# Patient Record
Sex: FEMALE | Race: WHITE | NOT HISPANIC OR LATINO | Employment: FULL TIME | URBAN - METROPOLITAN AREA
[De-identification: names, ages, dates, MRNs, and addresses within clinical notes are randomized per-mention and may not be internally consistent; named-entity substitution may affect disease eponyms.]

---

## 2018-11-06 ENCOUNTER — OFFICE VISIT (OUTPATIENT)
Dept: FAMILY MEDICINE | Facility: CLINIC | Age: 28
End: 2018-11-06
Payer: COMMERCIAL

## 2018-11-06 VITALS
OXYGEN SATURATION: 96 % | HEIGHT: 64 IN | WEIGHT: 167 LBS | HEART RATE: 73 BPM | SYSTOLIC BLOOD PRESSURE: 113 MMHG | DIASTOLIC BLOOD PRESSURE: 73 MMHG | BODY MASS INDEX: 28.51 KG/M2

## 2018-11-06 DIAGNOSIS — R05.9 COUGH: ICD-10-CM

## 2018-11-06 DIAGNOSIS — Z71.6 ENCOUNTER FOR SMOKING CESSATION COUNSELING: Primary | ICD-10-CM

## 2018-11-06 DIAGNOSIS — J20.9 ACUTE BRONCHITIS WITH SYMPTOMS > 10 DAYS: ICD-10-CM

## 2018-11-06 PROCEDURE — 99203 OFFICE O/P NEW LOW 30 MIN: CPT | Performed by: FAMILY MEDICINE

## 2018-11-06 RX ORDER — CODEINE PHOSPHATE AND GUAIFENESIN 10; 100 MG/5ML; MG/5ML
1-2 SOLUTION ORAL
Qty: 120 ML | Refills: 0 | Status: SHIPPED | OUTPATIENT
Start: 2018-11-06 | End: 2018-12-10

## 2018-11-06 RX ORDER — AZITHROMYCIN 250 MG/1
TABLET, FILM COATED ORAL
Qty: 6 TABLET | Refills: 0 | Status: SHIPPED | OUTPATIENT
Start: 2018-11-06 | End: 2018-11-14

## 2018-11-06 RX ORDER — BUPROPION HYDROCHLORIDE 150 MG/1
TABLET, EXTENDED RELEASE ORAL
Qty: 60 TABLET | Refills: 2 | Status: SHIPPED | OUTPATIENT
Start: 2018-11-06 | End: 2019-06-29

## 2018-11-06 NOTE — PROGRESS NOTES
"  SUBJECTIVE:   Rekha Michael is a 28 year old female who presents to clinic today for the following health issues:      Acute Illness   Acute illness concerns: cough  Onset: 3 weeks     Fever: no     Chills/Sweats: no    Headache (location?): no    Sinus Pressure:no    Conjunctivitis:  no    Ear Pain: no    Rhinorrhea: YES    Congestion: YES    Sore Throat: no      Cough: YES-non-productive    Wheeze: no    Decreased Appetite: no    Nausea: no    Vomiting: no    Diarrhea:  no    Dysuria/Freq.: no    Fatigue/Achiness: YES- wakes up from cough at night     Sick/Strep Exposure: no      Therapies Tried and outcome: nyquil, tea, humidifier      Cough is worse at night and would like something besides nyquil.   No heartburn.     PROBLEMS TO ADD ON... Discuss smoking medication to stop. She has tried chantix which made her nauseous. She has used nicotine patches and lozenges. She has tried the quit line plan. She would like to try welbutrin.     Problem list and histories reviewed & adjusted, as indicated.  Additional history: as documented    Labs reviewed in EPIC    Reviewed and updated as needed this visit by clinical staff       Reviewed and updated as needed this visit by Provider         ROS:  Constitutional, HEENT, cardiovascular, pulmonary, gi and gu systems are negative, except as otherwise noted.    OBJECTIVE:     /73  Pulse 73  Ht 5' 4\" (1.626 m)  Wt 167 lb (75.8 kg)  LMP 10/28/2018 (Exact Date)  SpO2 96%  Breastfeeding? No  BMI 28.67 kg/m2  Body mass index is 28.67 kg/(m^2).  GENERAL: healthy, alert and no distress  EYES: Eyes grossly normal to inspection  HENT: ear canals and TM's normal, nose and mouth without ulcers or lesions  NECK: no adenopathy  RESP: lungs clear to auscultation - no rales, rhonchi or wheezes  CV: regular rate and rhythm, normal S1 S2  MS: no gross musculoskeletal defects noted  SKIN: no suspicious lesions or rashes  NEURO: mentation intact and speech normal  PSYCH:  " affect normal/bright  LYMPH: no cervical adenopathy    Diagnostic Test Results:  none     ASSESSMENT/PLAN:       1. Encounter for smoking cessation counseling  - pt interested in Wellbutrin, discussed s/e and starting medication one week before quit date   - buPROPion (WELLBUTRIN SR) 150 MG 12 hr tablet; Take 1 tablet once daily and increase to 1 tablet twice daily after 4 to 7 days  Dispense: 60 tablet; Refill: 2    2. Cough  - azithromycin (ZITHROMAX) 250 MG tablet; Two tablets first day, then one tablet daily for four days.  Dispense: 6 tablet; Refill: 0    3. Acute bronchitis with symptoms > 10 days  - azithromycin (ZITHROMAX) 250 MG tablet; Two tablets first day, then one tablet daily for four days.  Dispense: 6 tablet; Refill: 0  - guaiFENesin-codeine (ROBITUSSIN AC) 100-10 MG/5ML SOLN solution; Take 5-10 mLs by mouth nightly as needed for cough  Dispense: 120 mL; Refill: 0; advised to not drink alcohol or drive with medication   - Follow if symptoms worsen or fail to improve.        Giovanna Arceo MD  [facility]

## 2018-11-06 NOTE — Clinical Note
Please abstract the following data from this visit with this patient into the appropriate field in Epic:  Pap smear done on this date: 12/2016 (approximately), by this group: [facility], results were normal.

## 2018-11-06 NOTE — MR AVS SNAPSHOT
After Visit Summary   11/6/2018    Rekha Michael    MRN: [id number]           Patient Information     Date Of Birth          1990        Visit Information        Provider Department      11/6/2018 7:20 AM Giovanna Arceo MD [facility]        Today's Diagnoses     Encounter for smoking cessation counseling    -  1    Cough        Acute bronchitis with symptoms > 10 days           Follow-ups after your visit        Follow-up notes from your care team     Return in about 7 days (around 11/13/2018) for sympotms are not improving.      Who to contact     If you have questions or need follow up information about today's clinic visit or your schedule please contact [facility] directly at [phone].  Normal or non-critical lab and imaging results will be communicated to you by [employer], letter or phone within 4 business days after the clinic has received the results. If you do not hear from us within 7 days, please contact the clinic through [employer] or phone. If you have a critical or abnormal lab result, we will notify you by phone as soon as possible.  Submit refill requests through [email or web address] or call your pharmacy and they will forward the refill request to us. Please allow 3 business days for your refill to be completed.          Additional Information About Your Visit        MyChart Information     [email or web address] gives you secure access to your electronic health record. If you see a primary care provider, you can also send messages to your care team and make appointments. If you have questions, please call your primary care clinic.  If you do not have a primary care provider, please call [phone] and they will assist you.        Care EveryWhere ID     This is your Care EveryWhere ID. This could be used by other organizations to access your [place] medical records  [id number]        Your Vitals Were     Pulse Height Last Period Pulse Oximetry Breastfeeding? BMI  "(Body Mass Index)    73 5' 4\" (1.626 m) 10/28/2018 (Exact Date) 96% No 28.67 kg/m2       Blood Pressure from Last 3 Encounters:   11/06/18 113/73   09/12/16 103/59    Weight from Last 3 Encounters:   11/06/18 167 lb (75.8 kg)   09/12/16 148 lb 1.6 oz (67.2 kg)              Today, you had the following     No orders found for display         Today's Medication Changes          These changes are accurate as of 11/6/18  2:39 PM.  If you have any questions, ask your nurse or doctor.               Start taking these medicines.        Dose/Directions    azithromycin 250 MG tablet   Commonly known as:  ZITHROMAX   Used for:  Cough, Acute bronchitis with symptoms > 10 days   Started by:  Giovanna Arceo MD        Two tablets first day, then one tablet daily for four days.   Quantity:  6 tablet   Refills:  0       buPROPion 150 MG 12 hr tablet   Commonly known as:  WELLBUTRIN SR   Used for:  Encounter for smoking cessation counseling   Started by:  Giovanna Arceo MD        Take 1 tablet once daily and increase to 1 tablet twice daily after 4 to 7 days   Quantity:  60 tablet   Refills:  2       guaiFENesin-codeine 100-10 MG/5ML Soln solution   Commonly known as:  ROBITUSSIN AC   Used for:  Acute bronchitis with symptoms > 10 days   Started by:  Giovanna Arceo MD        Dose:  1-2 tsp.   Take 5-10 mLs by mouth nightly as needed for cough   Quantity:  120 mL   Refills:  0         Stop taking these medicines if you haven't already. Please contact your care team if you have questions.     REGLAN PO   Stopped by:  Giovanna Arceo MD                Where to get your medicines      These medications were sent to [facility] [address] [place] [facility] [place] [facility] [address] [id number]     Phone:  [phone]     azithromycin 250 MG tablet    buPROPion 150 MG 12 hr tablet         Some of these will need a paper prescription and others can be bought over the counter.  Ask your " nurse if you have questions.     Bring a paper prescription for each of these medications     guaiFENesin-codeine 100-10 MG/5ML Soln solution                Primary Care Provider Office Phone # Fax #    Deqa Jay Arceo [phone] [address]  [facility] [address]        Equal Access to Services     [facility] : Hadii aad ku hadasho Soomaali, waaxda luqadaha, qaybta kaalmada adeegyada, waxay idiin hayaan adeeg khaleshiaprakash lajerald . So [facility] [phone].    ATENCIÓN: Si habla español, tiene a light disposición servicios gratuitos de asistencia lingüística. [facility] [phone].    We comply with applicable federal civil rights laws and [place] laws. We do not discriminate on the basis of race, color, national origin, age, disability, sex, sexual orientation, or gender identity.            Thank you!     Thank you for choosing [facility]  for your care. Our goal is always to provide you with excellent care. Hearing back from our patients is one way we can continue to improve our services. Please take a few minutes to complete the written survey that you may receive in the mail after your visit with us. Thank you!             Your Updated Medication List - Protect others around you: Learn how to safely use, store and throw away your medicines at www.disposemymeds.org.          This list is accurate as of 11/6/18  2:39 PM.  Always use your most recent med list.                   Brand Name Dispense Instructions for use Diagnosis    azithromycin 250 MG tablet    ZITHROMAX    6 tablet    Two tablets first day, then one tablet daily for four days.    Cough, Acute bronchitis with symptoms > 10 days       buPROPion 150 MG 12 hr tablet    WELLBUTRIN SR    60 tablet    Take 1 tablet once daily and increase to 1 tablet twice daily after 4 to 7 days    Encounter for smoking cessation counseling       guaiFENesin-codeine 100-10 MG/5ML Soln solution    ROBITUSSIN AC    120 mL    Take  5-10 mLs by mouth nightly as needed for cough    Acute bronchitis with symptoms > 10 days       IMITREX PO           oxyCODONE-acetaminophen 5-325 MG per tablet    PERCOCET     Take 1 tablet by mouth every 6 hours as needed for moderate to severe pain        ZOFRAN ODT PO      Take 4 mg by mouth every 8 hours as needed for nausea

## 2018-11-14 ENCOUNTER — OFFICE VISIT (OUTPATIENT)
Dept: INTERNAL MEDICINE | Facility: CLINIC | Age: 28
End: 2018-11-14
Payer: COMMERCIAL

## 2018-11-14 VITALS
DIASTOLIC BLOOD PRESSURE: 74 MMHG | TEMPERATURE: 98.7 F | SYSTOLIC BLOOD PRESSURE: 118 MMHG | BODY MASS INDEX: 29.03 KG/M2 | OXYGEN SATURATION: 99 % | WEIGHT: 169.1 LBS | HEART RATE: 77 BPM

## 2018-11-14 DIAGNOSIS — J20.9 ACUTE BRONCHITIS WITH SYMPTOMS > 10 DAYS: Primary | ICD-10-CM

## 2018-11-14 DIAGNOSIS — Z30.09 GENERAL COUNSELING FOR PRESCRIPTION OF ORAL CONTRACEPTIVES: ICD-10-CM

## 2018-11-14 PROCEDURE — 99214 OFFICE O/P EST MOD 30 MIN: CPT | Performed by: PHYSICIAN ASSISTANT

## 2018-11-14 RX ORDER — BENZONATATE 200 MG/1
200 CAPSULE ORAL 3 TIMES DAILY PRN
Qty: 21 CAPSULE | Refills: 0 | Status: SHIPPED | OUTPATIENT
Start: 2018-11-14 | End: 2018-12-10

## 2018-11-14 RX ORDER — PREDNISONE 20 MG/1
20 TABLET ORAL DAILY
Qty: 5 TABLET | Refills: 0 | Status: SHIPPED | OUTPATIENT
Start: 2018-11-14 | End: 2018-11-19

## 2018-11-14 RX ORDER — NORETHINDRONE ACETATE AND ETHINYL ESTRADIOL .02; 1 MG/1; MG/1
1 TABLET ORAL DAILY
Qty: 84 TABLET | Refills: 0 | Status: SHIPPED | OUTPATIENT
Start: 2018-11-14 | End: 2018-12-10

## 2018-11-14 NOTE — MR AVS SNAPSHOT
After Visit Summary   11/14/2018    Rekha Michael    MRN: [id number]           Patient Information     Date Of Birth          1990        Visit Information        Provider Department      11/14/2018 4:00 PM Marivel Hernandez PA-C [facility]        Today's Diagnoses     Acute bronchitis with symptoms > 10 days    -  1    General counseling for prescription of oral contraceptives           Follow-ups after your visit        Who to contact     If you have questions or need follow up information about today's clinic visit or your schedule please contact [facility] directly at [phone].  Normal or non-critical lab and imaging results will be communicated to you by [employer], letter or phone within 4 business days after the clinic has received the results. If you do not hear from us within 7 days, please contact the clinic through [employer] or phone. If you have a critical or abnormal lab result, we will notify you by phone as soon as possible.  Submit refill requests through [employer] or call your pharmacy and they will forward the refill request to us. Please allow 3 business days for your refill to be completed.          Additional Information About Your Visit        MyChart Information     [employer] gives you secure access to your electronic health record. If you see a primary care provider, you can also send messages to your care team and make appointments. If you have questions, please call your primary care clinic.  If you do not have a primary care provider, please call [phone] and they will assist you.        Care EveryWhere ID     This is your Care EveryWhere ID. This could be used by other organizations to access your [place] medical records  [id number]        Your Vitals Were     Pulse Temperature Last Period Pulse Oximetry BMI (Body Mass Index)       77 98.7  F (37.1  C) (Oral) 10/28/2018 (Exact Date) 99% 29.03 kg/m2         Blood Pressure from Last 3 Encounters:   11/14/18 118/74   11/06/18 113/73   09/12/16 103/59    Weight from Last 3 Encounters:   11/14/18 169 lb 1.6 oz (76.7 kg)   11/06/18 167 lb (75.8 kg)   09/12/16 148 lb 1.6 oz (67.2 kg)              Today, you had the following     No orders found for display         Today's Medication Changes          These changes are accurate as of 11/14/18  4:15 PM.  If you have any questions, ask your nurse or doctor.               Start taking these medicines.        Dose/Directions    benzonatate 200 MG capsule   Commonly known as:  TESSALON   Used for:  Acute bronchitis with symptoms > 10 days   Started by:  Marivel Hernandez PA-C        Dose:  200 mg   Take 1 capsule (200 mg) by mouth 3 times daily as needed for cough   Quantity:  21 capsule   Refills:  0       norethindrone-ethinyl estradiol 1-20 MG-MCG per tablet   Commonly known as:  MICROGESTIN 1/20   Used for:  General counseling for prescription of oral contraceptives   Started by:  Marivel Hernandez PA-C        Dose:  1 tablet   Take 1 tablet by mouth daily   Quantity:  84 tablet   Refills:  0       predniSONE 20 MG tablet   Commonly known as:  DELTASONE   Used for:  Acute bronchitis with symptoms > 10 days   Started by:  Marivel Hernandez PA-C        Dose:  20 mg   Take 1 tablet (20 mg) by mouth daily for 5 days   Quantity:  5 tablet   Refills:  0            Where to get your medicines      These medications were sent to [place] [address] [place] [facility] [address] [address]     Phone:  [phone]     benzonatate 200 MG capsule    norethindrone-ethinyl estradiol 1-20 MG-MCG per tablet    predniSONE 20 MG tablet                Primary Care Provider Office Phone # Fax #    Deqa Jay Arceo [phone] [address]  [facility] [address]        Equal Access to Services     SHAZIA VASQUEZ AH: jyothi Yi,  gina willardaajareth ah. So [facility] [phone].    ATENCIÓN: Si seema garcia, tiene a light disposición servicios gratuitos de asistencia lingüística. Kandy al [phone].    We comply with applicable federal civil rights laws and [place] laws. We do not discriminate on the basis of race, color, national origin, age, disability, sex, sexual orientation, or gender identity.            Thank you!     Thank you for choosing [facility]  for your care. Our goal is always to provide you with excellent care. Hearing back from our patients is one way we can continue to improve our services. Please take a few minutes to complete the written survey that you may receive in the mail after your visit with us. Thank you!             Your Updated Medication List - Protect others around you: Learn how to safely use, store and throw away your medicines at www.disposemymeds.org.          This list is accurate as of 11/14/18  4:15 PM.  Always use your most recent med list.                   Brand Name Dispense Instructions for use Diagnosis    benzonatate 200 MG capsule    TESSALON    21 capsule    Take 1 capsule (200 mg) by mouth 3 times daily as needed for cough    Acute bronchitis with symptoms > 10 days       buPROPion 150 MG 12 hr tablet    WELLBUTRIN SR    60 tablet    Take 1 tablet once daily and increase to 1 tablet twice daily after 4 to 7 days    Encounter for smoking cessation counseling       guaiFENesin-codeine 100-10 MG/5ML Soln solution    ROBITUSSIN AC    120 mL    Take 5-10 mLs by mouth nightly as needed for cough    Acute bronchitis with symptoms > 10 days       IMITREX PO           norethindrone-ethinyl estradiol 1-20 MG-MCG per tablet    MICROGESTIN 1/20    84 tablet    Take 1 tablet by mouth daily    General counseling for prescription of oral contraceptives       oxyCODONE-acetaminophen 5-325 MG per tablet    PERCOCET     Take 1 tablet by  mouth every 6 hours as needed for moderate to severe pain        predniSONE 20 MG tablet    DELTASONE    5 tablet    Take 1 tablet (20 mg) by mouth daily for 5 days    Acute bronchitis with symptoms > 10 days       ZOFRAN ODT PO      Take 4 mg by mouth every 8 hours as needed for nausea

## 2018-11-14 NOTE — PROGRESS NOTES
SUBJECTIVE:   Rekha Michael is a 28 year old female who presents to clinic today for the following health issues:      Patient is here because she is still coughing. Coughing has gotten worst.     RESPIRATORY SYMPTOMS      Duration: seen last week for this with PCP office    Treatment with zithromax and codeine cough medication     Description  No runny nose or congestion   No sore throat  Cough  More loose now after abx and guaifenesin cough medications     Severity: moderate    Accompanying signs and symptoms: None    History (predisposing factors):  As above     Smoker, will be quitting tomorrow     And on wellbutrin     Precipitating or alleviating factors: None    Therapies tried and outcome:  As above     Patient also asking to restart birth controll  Recently restarted sexual activity with - had low to no libido for a long time, issues with endometrosis and surgeries in the past  Last with IUD- removed several months ago due to pain  Had been on Depo prior  Oral BCP when she was a teenager.       Problem list and histories reviewed & adjusted, as indicated.  Additional history: as documented    Labs reviewed in EPIC    Reviewed and updated as needed this visit by clinical staff  Allergies  Meds       Reviewed and updated as needed this visit by Provider  Allergies  Meds         ROS:  Constitutional, HEENT, cardiovascular, pulmonary, gi and gu systems are negative, except as otherwise noted.    OBJECTIVE:     /74  Pulse 77  Temp 98.7  F (37.1  C) (Oral)  Wt 169 lb 1.6 oz (76.7 kg)  LMP 10/28/2018 (Exact Date)  SpO2 99%  BMI 29.03 kg/m2  Body mass index is 29.03 kg/(m^2).  GENERAL: healthy, alert and no distress  HENT: ear canals and TM's normal, nose and mouth without ulcers or lesions  NECK: no adenopathy, no asymmetry, masses, or scars and thyroid normal to palpation  RESP: lungs clear to auscultation - no rales, rhonchi or wheezes  CV: regular rates and rhythm and normal S1 S2,  no S3 or S4  SKIN: no suspicious lesions or rashes    Diagnostic Test Results:  none     ASSESSMENT/PLAN:             1. Acute bronchitis with symptoms > 10 days  Reviewed no further abx needed   meds as below to help with coughing   - benzonatate (TESSALON) 200 MG capsule; Take 1 capsule (200 mg) by mouth 3 times daily as needed for cough  Dispense: 21 capsule; Refill: 0  - predniSONE (DELTASONE) 20 MG tablet; Take 1 tablet (20 mg) by mouth daily for 5 days  Dispense: 5 tablet; Refill: 0    2. General counseling for prescription of oral contraceptives  BCP start - patient quitting smoking and is < 35 so can start combination pill.   Follow up with PCP in 2-3 months for recheck on BCP - blood pressure and further refills.   - norethindrone-ethinyl estradiol (MICROGESTIN 1/20) 1-20 MG-MCG per tablet; Take 1 tablet by mouth daily  Dispense: 84 tablet; Refill: 0        Marivel Hernandez PA-C  [facility]

## 2018-12-10 ENCOUNTER — OFFICE VISIT (OUTPATIENT)
Dept: FAMILY MEDICINE | Facility: CLINIC | Age: 28
End: 2018-12-10
Payer: COMMERCIAL

## 2018-12-10 VITALS
TEMPERATURE: 98.3 F | HEART RATE: 71 BPM | DIASTOLIC BLOOD PRESSURE: 83 MMHG | SYSTOLIC BLOOD PRESSURE: 131 MMHG | BODY MASS INDEX: 29.02 KG/M2 | OXYGEN SATURATION: 97 % | HEIGHT: 64 IN | WEIGHT: 170 LBS

## 2018-12-10 DIAGNOSIS — Z00.00 PREVENTATIVE HEALTH CARE: ICD-10-CM

## 2018-12-10 DIAGNOSIS — Z13.220 SCREENING FOR LIPID DISORDERS: ICD-10-CM

## 2018-12-10 DIAGNOSIS — R53.83 OTHER FATIGUE: ICD-10-CM

## 2018-12-10 DIAGNOSIS — Z30.09 GENERAL COUNSELING FOR PRESCRIPTION OF ORAL CONTRACEPTIVES: ICD-10-CM

## 2018-12-10 DIAGNOSIS — Z11.3 SCREEN FOR STD (SEXUALLY TRANSMITTED DISEASE): ICD-10-CM

## 2018-12-10 DIAGNOSIS — R22.1 NECK ENLARGEMENT: ICD-10-CM

## 2018-12-10 DIAGNOSIS — G43.909 MIGRAINE WITHOUT STATUS MIGRAINOSUS, NOT INTRACTABLE, UNSPECIFIED MIGRAINE TYPE: ICD-10-CM

## 2018-12-10 LAB
ERYTHROCYTE [DISTWIDTH] IN BLOOD BY AUTOMATED COUNT: 12.6 % (ref 10–15)
HCT VFR BLD AUTO: 39.3 % (ref 35–47)
HGB BLD-MCNC: 13.1 G/DL (ref 11.7–15.7)
MCH RBC QN AUTO: 30 PG (ref 26.5–33)
MCHC RBC AUTO-ENTMCNC: 33.3 G/DL (ref 31.5–36.5)
MCV RBC AUTO: 90 FL (ref 78–100)
PLATELET # BLD AUTO: 202 10E9/L (ref 150–450)
RBC # BLD AUTO: 4.37 10E12/L (ref 3.8–5.2)
WBC # BLD AUTO: 8.4 10E9/L (ref 4–11)

## 2018-12-10 PROCEDURE — 86780 TREPONEMA PALLIDUM: CPT | Performed by: FAMILY MEDICINE

## 2018-12-10 PROCEDURE — 84443 ASSAY THYROID STIM HORMONE: CPT | Performed by: FAMILY MEDICINE

## 2018-12-10 PROCEDURE — 80061 LIPID PANEL: CPT | Performed by: FAMILY MEDICINE

## 2018-12-10 PROCEDURE — 80048 BASIC METABOLIC PNL TOTAL CA: CPT | Performed by: FAMILY MEDICINE

## 2018-12-10 PROCEDURE — 87591 N.GONORRHOEAE DNA AMP PROB: CPT | Performed by: FAMILY MEDICINE

## 2018-12-10 PROCEDURE — 85027 COMPLETE CBC AUTOMATED: CPT | Performed by: FAMILY MEDICINE

## 2018-12-10 PROCEDURE — 99213 OFFICE O/P EST LOW 20 MIN: CPT | Mod: 25 | Performed by: FAMILY MEDICINE

## 2018-12-10 PROCEDURE — 87389 HIV-1 AG W/HIV-1&-2 AB AG IA: CPT | Performed by: FAMILY MEDICINE

## 2018-12-10 PROCEDURE — 99395 PREV VISIT EST AGE 18-39: CPT | Performed by: FAMILY MEDICINE

## 2018-12-10 PROCEDURE — 87491 CHLMYD TRACH DNA AMP PROBE: CPT | Performed by: FAMILY MEDICINE

## 2018-12-10 PROCEDURE — 36415 COLL VENOUS BLD VENIPUNCTURE: CPT | Performed by: FAMILY MEDICINE

## 2018-12-10 RX ORDER — NORETHINDRONE ACETATE AND ETHINYL ESTRADIOL .02; 1 MG/1; MG/1
1 TABLET ORAL DAILY
Qty: 84 TABLET | Refills: 0 | Status: SHIPPED | OUTPATIENT
Start: 2018-12-10 | End: 2018-12-10

## 2018-12-10 RX ORDER — NORETHINDRONE ACETATE AND ETHINYL ESTRADIOL .02; 1 MG/1; MG/1
1 TABLET ORAL DAILY
Qty: 84 TABLET | Refills: 3 | Status: SHIPPED | OUTPATIENT
Start: 2018-12-10

## 2018-12-10 RX ORDER — SUMATRIPTAN 50 MG/1
TABLET, FILM COATED ORAL
Qty: 7 TABLET | Refills: 5 | Status: SHIPPED | OUTPATIENT
Start: 2018-12-10

## 2018-12-10 ASSESSMENT — MIFFLIN-ST. JEOR: SCORE: 1468.17

## 2018-12-10 ASSESSMENT — ENCOUNTER SYMPTOMS
FREQUENCY: 0
WEAKNESS: 0
SORE THROAT: 0
PARESTHESIAS: 0
HEARTBURN: 0
ARTHRALGIAS: 0
DIZZINESS: 0
DYSURIA: 0
ABDOMINAL PAIN: 1
FEVER: 0
DIARRHEA: 0
JOINT SWELLING: 0
HEADACHES: 1
BREAST MASS: 0
COUGH: 1
PALPITATIONS: 0
SHORTNESS OF BREATH: 0
CHILLS: 0
NERVOUS/ANXIOUS: 0
HEMATOCHEZIA: 0
NAUSEA: 0
EYE PAIN: 0
HEMATURIA: 0
CONSTIPATION: 0
MYALGIAS: 0

## 2018-12-10 NOTE — PROGRESS NOTES
Answers for HPI/ROS submitted by the patient on 12/10/2018   Annual Exam:  Frequency of exercise:: 2-3 days/week  Getting at least 3 servings of Calcium per day:: Yes  Diet:: Regular (no restrictions)  Taking medications regularly:: Yes  Medication side effects:: Not applicable  Bi-annual eye exam:: Yes  Dental care twice a year:: Yes  Sleep apnea or symptoms of sleep apnea:: None  Positive for the following: abdominal pain, cough, headaches  Negative for the following: Blood in stool, Blood in urine, chest pain, chills, congestion, constipation, diarrhea, dizziness, ear pain, eye pain, nervous/anxious, fever, frequency, genital sores, hearing loss, heartburn, arthralgias, joint swelling, peripheral edema, mood changes, myalgias, nausea, dysuria, palpitations, Skin sensation changes, sore throat, urgency, rash, shortness of breath, visual disturbance, weakness  pelvic pain: Yes  vaginal bleeding: No  vaginal discharge: No  tenderness: No  breast mass: No  breast discharge: No  Additional concerns today:: Yes  Duration of exercise:: 30-45 minutes

## 2018-12-10 NOTE — PROGRESS NOTES
b   SUBJECTIVE:   CC: Rekha Michael is an 28 year old woman who presents for preventive health visit.     Physical   Annual:     Getting at least 3 servings of Calcium per day:  Yes    Bi-annual eye exam:  Yes    Dental care twice a year:  Yes    Sleep apnea or symptoms of sleep apnea:  None    Diet:  Regular (no restrictions)    Frequency of exercise:  2-3 days/week    Duration of exercise:  30-45 minutes    Taking medications regularly:  Yes    Medication side effects:  Not applicable    Additional concerns today:  Yes    PHQ-2 Total Score: 0    Pt has endometriosis and having a lot of fatigue. She has a family history of thyroid problems.  She started experiencing fatigue for the last few months. Unsure if it is related to endometriosis pain.   She restarted birth control around three weeks ago. Since starting medication, she has mild spotting.   No fever, chills, cough, shortness of breath, night sweats or appetite changes.  Normal sleep.   She noticed neck enlargement over the last few months.     Today's PHQ-2 Score:   PHQ-2 ( 1999 [employer]) 12/10/2018   Q1: Little interest or pleasure in doing things 0   Q2: Feeling down, depressed or hopeless 0   PHQ-2 Score 0   Q1: Little interest or pleasure in doing things Not at all   Q2: Feeling down, depressed or hopeless Not at all   PHQ-2 Score 0       Abuse: Current or Past(Physical, Sexual or Emotional)- No  Do you feel safe in your environment? Yes    Social History     Tobacco Use     Smoking status: Current Every Day Smoker     Packs/day: 0.50     Types: Cigarettes     Smokeless tobacco: Never Used   Substance Use Topics     Alcohol use: Yes     Alcohol Use 12/10/2018   If you drink alcohol do you typically have greater than 3 drinks per day OR greater than 7 drinks per week? No       Reviewed orders with patient.  Reviewed health maintenance and updated orders accordingly - Yes  Labs reviewed in EPIC    Mammogram not appropriate for this patient based on  "age.    Pertinent mammograms are reviewed under the imaging tab.  History of abnormal Pap smear: NO - age 30- 65 PAP every 3 years recommended     Reviewed and updated as needed this visit by clinical staff         Reviewed and updated as needed this visit by Provider            Review of Systems  CONSTITUTIONAL: see above   INTEGUMENTARU/SKIN: NEGATIVE for worrisome rashes, moles or lesions  EYES: NEGATIVE for vision changes or irritation  ENT: see above   RESP: NEGATIVE for significant cough or SOB  BREAST: NEGATIVE for masses, tenderness or discharge  CV: NEGATIVE for chest pain, palpitations or peripheral edema  GI: NEGATIVE for nausea, abdominal pain, heartburn, or change in bowel habits  : NEGATIVE for unusual urinary or vaginal symptoms. Periods are regular.  MUSCULOSKELETAL: NEGATIVE for significant arthralgias or myalgia  NEURO: NEGATIVE for weakness, dizziness or paresthesias  PSYCHIATRIC: NEGATIVE for changes in mood or affect     OBJECTIVE:   Physical Exam   /83   Pulse 71   Temp 98.3  F (36.8  C) (Oral)   Ht 1.613 m (5' 3.5\")   Wt 77.1 kg (170 lb)   LMP 11/22/2018 (Approximate)   SpO2 97%   Breastfeeding? No   BMI 29.64 kg/m    GENERAL: healthy, alert and no distress  EYES: Eyes grossly normal to inspection, PERRL and conjunctivae and sclerae normal  HENT: ear canals and TM's normal, nose and mouth without ulcers or lesions  NECK: no adenopathy, no asymmetry, masses, or scars and thyroid normal to palpation  RESP: lungs clear to auscultation - no rales, rhonchi or wheezes  CV: regular rate and rhythm, normal S1 S2  ABDOMEN: soft, nontender, no hepatosplenomegaly, no masses and bowel sounds normal  MS: no gross musculoskeletal defects noted, no edema  SKIN: no suspicious lesions or rashes  NEURO: Normal strength and tone, mentation intact and speech normal  PSYCH: mentation appears normal, affect normal/bright    Diagnostic Test Results:  none     ASSESSMENT/PLAN:     1. Preventative " "health care  - see below     2. Screen for STD (sexually transmitted disease)  - Treponema Abs w Reflex to RPR and Titer  - NEISSERIA GONORRHOEA PCR  - CHLAMYDIA TRACHOMATIS PCR  - HIV Antigen Antibody Combo      3. Other fatigue  - D/d include postinfectious as pt had recent illness vs thyroid etiology vs electrolyte imbalance; unlikely inflammatory arthritis vs tick borne illness (pt denies exposure) vs BATSHEVA (normal sleep) vs depression   - ordered below for further evaluation and tx as indicated  - if below is negative then advised to monitor for another 2-3 weeks, call or my chart if symptoms continue to persist  - TSH with free T4 reflex  - CBC with platelets  - Basic metabolic panel  - OFFICE/OUTPT VISIT,EST,LEVL III    4. General counseling for prescription of oral contraceptives  - norethindrone-ethinyl estradiol (MICROGESTIN 1/20) 1-20 MG-MCG tablet; Take 1 tablet by mouth daily  Dispense: 84 tablet; Refill: 3    5. Migraine without status migrainosus, not intractable, unspecified migraine type  - SUMAtriptan (IMITREX) 50 MG tablet; 50 mg at the onset of the headache, can repeat every 2 hours for a maximum daily dose of 200 mg  Dispense: 7 tablet; Refill: 5    6. Screening for lipid disorders  - Lipid Profile (Chol, Trig, HDL, LDL calc)    7. Neck enlargement  - ordered US for further evaluation   - US Thyroid; Future        COUNSELING:  Reviewed preventive health counseling, as reflected in patient instructions    BP Readings from Last 1 Encounters:   11/14/18 118/74     Estimated body mass index is 29.03 kg/m  as calculated from the following:    Height as of 11/6/18: 1.626 m (5' 4\").    Weight as of 11/14/18: 76.7 kg (169 lb 1.6 oz).           reports that she has been smoking cigarettes.  She has been smoking about 0.50 packs per day. she has never used smokeless tobacco.      Counseling Resources:  ATP IV Guidelines  Pooled Cohorts Equation Calculator  Breast Cancer Risk Calculator  FRAX Risk " Assessment  ICSI Preventive Guidelines  Dietary Guidelines for Americans, 2010  USDA's MyPlate  ASA Prophylaxis  Lung CA Screening    Giovanna Arceo MD  [facility] [place]

## 2018-12-10 NOTE — PATIENT INSTRUCTIONS
Please call [phone] to schedule ultrasound.       Preventive Health Recommendations  Female Ages 26 - 39  Yearly exam:   See your health care provider every year in order to    Review health changes.     Discuss preventive care.      Review your medicines if you your doctor has prescribed any.    Until age 30: Get a Pap test every three years (more often if you have had an abnormal result).    After age 30: Talk to your doctor about whether you should have a Pap test every 3 years or have a Pap test with HPV screening every 5 years.   You do not need a Pap test if your uterus was removed (hysterectomy) and you have not had cancer.  You should be tested each year for STDs (sexually transmitted diseases), if you're at risk.   Talk to your provider about how often to have your cholesterol checked.  If you are at risk for diabetes, you should have a diabetes test (fasting glucose).  Shots: Get a flu shot each year. Get a tetanus shot every 10 years.   Nutrition:     Eat at least 5 servings of fruits and vegetables each day.    Eat whole-grain bread, whole-wheat pasta and brown rice instead of white grains and rice.    Get adequate Calcium and Vitamin D.     Lifestyle    Exercise at least 150 minutes a week (30 minutes a day, 5 days of the week). This will help you control your weight and prevent disease.    Limit alcohol to one drink per day.    No smoking.     Wear sunscreen to prevent skin cancer.    See your dentist every six months for an exam and cleaning.

## 2018-12-11 LAB
ANION GAP SERPL CALCULATED.3IONS-SCNC: 6 MMOL/L (ref 3–14)
BUN SERPL-MCNC: 13 MG/DL (ref 7–30)
C TRACH DNA SPEC QL NAA+PROBE: NEGATIVE
CALCIUM SERPL-MCNC: 9.2 MG/DL (ref 8.5–10.1)
CHLORIDE SERPL-SCNC: 105 MMOL/L (ref 94–109)
CHOLEST SERPL-MCNC: 167 MG/DL
CO2 SERPL-SCNC: 25 MMOL/L (ref 20–32)
CREAT SERPL-MCNC: 0.84 MG/DL (ref 0.52–1.04)
GFR SERPL CREATININE-BSD FRML MDRD: 79 ML/MIN/1.7M2
GLUCOSE SERPL-MCNC: 77 MG/DL (ref 70–99)
HDLC SERPL-MCNC: 63 MG/DL
HIV 1+2 AB+HIV1 P24 AG SERPL QL IA: NONREACTIVE
LDLC SERPL CALC-MCNC: 90 MG/DL
N GONORRHOEA DNA SPEC QL NAA+PROBE: NEGATIVE
NONHDLC SERPL-MCNC: 104 MG/DL
POTASSIUM SERPL-SCNC: 3.7 MMOL/L (ref 3.4–5.3)
SODIUM SERPL-SCNC: 136 MMOL/L (ref 133–144)
SPECIMEN SOURCE: NORMAL
SPECIMEN SOURCE: NORMAL
T PALLIDUM AB SER QL: NONREACTIVE
TRIGL SERPL-MCNC: 72 MG/DL
TSH SERPL DL<=0.005 MIU/L-ACNC: 1.24 MU/L (ref 0.4–4)

## 2019-01-03 ENCOUNTER — OFFICE VISIT (OUTPATIENT)
Dept: FAMILY MEDICINE | Facility: CLINIC | Age: 29
End: 2019-01-03

## 2019-01-03 VITALS
HEART RATE: 63 BPM | OXYGEN SATURATION: 100 % | TEMPERATURE: 98.3 F | BODY MASS INDEX: 31.04 KG/M2 | WEIGHT: 178 LBS | SYSTOLIC BLOOD PRESSURE: 128 MMHG | DIASTOLIC BLOOD PRESSURE: 85 MMHG

## 2019-01-03 DIAGNOSIS — R42 VERTIGO: Primary | ICD-10-CM

## 2019-01-03 PROCEDURE — 99214 OFFICE O/P EST MOD 30 MIN: CPT | Performed by: INTERNAL MEDICINE

## 2019-01-03 RX ORDER — FLUTICASONE PROPIONATE 50 MCG
1-2 SPRAY, SUSPENSION (ML) NASAL 2 TIMES DAILY
Qty: 1 BOTTLE | Refills: 0 | Status: SHIPPED | OUTPATIENT
Start: 2019-01-03 | End: 2019-01-13

## 2019-01-03 RX ORDER — MECLIZINE HYDROCHLORIDE 25 MG/1
25 TABLET ORAL 3 TIMES DAILY PRN
Qty: 30 TABLET | Refills: 0 | Status: SHIPPED | OUTPATIENT
Start: 2019-01-03 | End: 2019-01-13

## 2019-01-03 ASSESSMENT — ENCOUNTER SYMPTOMS
VOMITING: 0
NAUSEA: 1
PALPITATIONS: 0
NUMBNESS: 0
ADENOPATHY: 0
FEVER: 0
ARTHRALGIAS: 1
FACIAL ASYMMETRY: 0
MYALGIAS: 1
ABDOMINAL PAIN: 0
COUGH: 0
FATIGUE: 1
SPEECH DIFFICULTY: 0
WEAKNESS: 0
SHORTNESS OF BREATH: 0

## 2019-01-03 NOTE — PROGRESS NOTES
SUBJECTIVE:   Rekha Michael is a 28 year old female presenting with a chief complaint of   Chief Complaint   Patient presents with     Dizziness     Pt in clinic c/o 7 days of dizziness and nausea.     Nausea       She is an established patient of [facility].    Onset of symptoms was 1week(s).  spinn on Friday night after drinking  Worsening after time  Now symptoms worse with turning head right   Dizzy - described as spins, nausea  Needs to brace self  Course of illness is worsening    On menses and has migraines during her period  Having headaches but baseline headaches  Predisposing factors: recent Bronchitis  Treatment and measures tried: fluids  Trying to not turn head      Review of Systems   Constitutional: Positive for fatigue. Negative for fever.   HENT: Positive for tinnitus.         Sick in November with bronchitis 6 weeks  Cold symptoms resolved     On-going intermittently ear ringing   Eyes: Positive for visual disturbance.        Feels like eyeballs twitch with spinning symptoms    Respiratory: Negative for cough and shortness of breath.    Cardiovascular: Negative for chest pain and palpitations.   Gastrointestinal: Positive for nausea. Negative for abdominal pain and vomiting.   Endocrine:        Feels thyroid enlarged  Recent TSH normal    Genitourinary:        On menses   Musculoskeletal: Positive for arthralgias and myalgias.   Skin: Negative for rash.   Allergic/Immunologic: Negative for environmental allergies and food allergies.   Neurological: Negative for facial asymmetry, speech difficulty, weakness and numbness.   Hematological: Negative for adenopathy.     Component      Latest Ref Rng & Units 12/10/2018   Sodium      133 - 144 mmol/L 136   Potassium      3.4 - 5.3 mmol/L 3.7   Chloride      94 - 109 mmol/L 105   Carbon Dioxide      20 - 32 mmol/L 25   Anion Gap      3 - 14 mmol/L 6   Glucose      70 - 99 mg/dL 77   Urea Nitrogen      7 - 30 mg/dL 13   Creatinine      0.52 - 1.04 mg/dL  0.84   GFR Estimate      >60 mL/min/1.7m2 79   GFR Estimate If Black      >60 mL/min/1.7m2 >90   Calcium      8.5 - 10.1 mg/dL 9.2   WBC      4.0 - 11.0 10e9/L 8.4   RBC Count      3.8 - 5.2 10e12/L 4.37   Hemoglobin      11.7 - 15.7 g/dL 13.1   Hematocrit      35.0 - 47.0 % 39.3   MCV      78 - 100 fl 90   MCH      26.5 - 33.0 pg 30.0   MCHC      31.5 - 36.5 g/dL 33.3   RDW      10.0 - 15.0 % 12.6   Platelet Count      150 - 450 10e9/L 202   Cholesterol      <200 mg/dL 167   Triglycerides      <150 mg/dL 72   HDL Cholesterol      >49 mg/dL 63   LDL Cholesterol Calculated      <100 mg/dL 90   Non HDL Cholesterol      <130 mg/dL 104   TSH      0.40 - 4.00 mU/L 1.24       Past Medical History:   Diagnosis Date     Endometriosis      Migraine      History reviewed. No pertinent family history.  Current Outpatient Medications   Medication Sig Dispense Refill     buPROPion (WELLBUTRIN SR) 150 MG 12 hr tablet Take 1 tablet once daily and increase to 1 tablet twice daily after 4 to 7 days 60 tablet 2     fluticasone (FLONASE) 50 MCG/ACT nasal spray Spray 1-2 sprays into both nostrils 2 times daily for 10 days 1 Bottle 0     meclizine (ANTIVERT) 25 MG tablet Take 1 tablet (25 mg) by mouth 3 times daily as needed for dizziness 30 tablet 0     norethindrone-ethinyl estradiol (MICROGESTIN 1/20) 1-20 MG-MCG tablet Take 1 tablet by mouth daily 84 tablet 3     oxyCODONE-acetaminophen (PERCOCET) 5-325 MG per tablet Take 1 tablet by mouth every 6 hours as needed for moderate to severe pain       SUMAtriptan (IMITREX) 50 MG tablet 50 mg at the onset of the headache, can repeat every 2 hours for a maximum daily dose of 200 mg 7 tablet 5     Ondansetron (ZOFRAN ODT PO) Take 4 mg by mouth every 8 hours as needed for nausea       Social History     Tobacco Use     Smoking status: Current Some Day Smoker     Packs/day: 0.50     Types: Cigarettes     Smokeless tobacco: Never Used   Substance Use Topics     Alcohol use: Yes        OBJECTIVE  /85   Pulse 63   Temp 98.3  F (36.8  C) (Tympanic)   Wt 80.7 kg (178 lb)   SpO2 100%   BMI 31.04 kg/m      Physical Exam   Constitutional: She appears well-developed and well-nourished.   HENT:   Mouth/Throat: Oropharynx is clear and moist. No oropharyngeal exudate.   Tm nl b   Eyes: EOM are normal. Pupils are equal, round, and reactive to light.   Neck: No thyromegaly present.   Cardiovascular: Normal rate, regular rhythm and normal heart sounds.   Pulmonary/Chest: Effort normal and breath sounds normal.   Abdominal: Soft.   Lymphadenopathy:     She has no cervical adenopathy.   Neurological: No cranial nerve deficit or sensory deficit. She exhibits normal muscle tone. Coordination normal.   Psychiatric: She has a normal mood and affect.   Vitals reviewed.      Labs:  No results found for this or any previous visit (from the past 24 hour(s)).        ASSESSMENT:      ICD-10-CM    1. Vertigo R42 meclizine (ANTIVERT) 25 MG tablet     OTOLARYNGOLOGY REFERRAL     AUDIOLOGY BALANCE REFERRAL     fluticasone (FLONASE) 50 MCG/ACT nasal spray        Medical Decision Making:  Neuro exam normal   No concerns for CNS event or lesion as normal exam  And no symptoms of concern in history  No no further workup needed at this time.    PLAN:  flonase  Meclizine  Referral for epley maneuver  If symptoms continue for 1 month, then see Otolaryngology         Patient Instructions               Patient Education     Dizziness (Vertigo) and Balance Problems: Staying Safe     Replace burned-out light bulbs to keep your home safe and well lit.     Falls or accidents can lead to pain, broken bones, and fear of future falls. Protect yourself and others by preparing for episodes. Simple steps can help you stay safe at home and wherever you go.  Lighting  Keep all areas well lit. This helps your eyes send the right signals to the brain. It also makes you less likely to trip and fall. If bright lights make symptoms  worse, dim the lights or lie in a dark room until the dizziness passes. Then turn the lights back to their normal level.  Tips:    Keep a flashlight by the bed.    Place nightlights in bathrooms and hallways.    Replace burned-out bulbs, or have someone replace them for you.  Preventing falls  To reduce your risk of falling:    Get out of bed or up from a chair slowly.    Wear low-heeled shoes that fit properly and have slip-resistant soles.    Remove throw rugs. Clear clutter from walkways.    Use handrails on stairs. Have handrails installed or adjusted if needed.    Install grab bars in the bathroom. Don't use towel racks for balance.    Use a shower stool. Also put adhesive strips in the shower or on the tub floor.  Going out  With a little time and preparation, you can get around safely.  Tips:    Bring a cane or walking aid if needed.    Give yourself plenty of time in case you start to get dizzy.    Ask your healthcare provider what type of exercise is safe for your condition.    Be patient. If an activity such as walking through a crowded shop causes you stress, you may not be ready for it yet.  Driving  If you become dizzy or disoriented while driving, you could hurt yourself and others. That's why it's best to not drive until symptoms have gone away. In some cases, your license may be temporarily held until it's safe for you to drive again.  For safety:    Ask a friend to drive for you.    Take public transportation.    Walk to stores and other places when you can.  Asking for help  Don't be afraid to ask for help running errands, cooking meals, and doing exercise. Whether it's a friend, loved one, neighbor, or stranger on the street, a little help can make a world of difference.   Date Last Reviewed: 11/1/2016 2000-2018 [employer]. [address] [facility], [place] [address]. All rights reserved. This information is not intended as a substitute for professional medical care. Always follow  your healthcare professional's instructions.           Patient Education     Dizziness (Uncertain Cause)  Dizziness is a common symptom. It may be described as lightheadedness, spinning, or feeling like you are going to faint. Dizziness can have many causes.  Be sure to tell the healthcare provider about:    All medicines you take, including prescription, over-the-counter, herbs, and supplements    Any other symptoms you have    Any health problems you are being treated for    Any past major health problems you've had, such as a heart attack, balance issues, hearing problems, or blood pressure problems    Anything that causes the dizziness to get worse or better  Today's exam did not show an exact cause for your dizziness. Other tests may be needed. Follow up with your healthcare provider.  Home care    Dizziness that occurs with sudden standing may be a sign of mild dehydration. Drink extra fluids for the next few days.    If you recently started a new medicine, stopped a medicine, or had the dose of a current medicine changed, talk with the prescribing healthcare provider. Your medicine plan may need adjustment.    If dizziness lasts more than a few seconds, sit or lie down until it passes. This may help prevent injury in case you pass out. Get up slowly when you feel better.    Don't drive or use power tools or dangerous equipment until you have had no dizziness for at least 48 hours.  Follow-up care  Follow up with your healthcare provider for further evaluation within the next 7 days or as advised.  When to seek medical advice  Call your healthcare provider for any of the following:    Worsening of symptoms or new symptoms    Passing out or seizure    Repeated vomiting    Headache    Palpitations (the sense that your heart is fluttering or beating fast or hard)    Shortness of breath    Blood in vomit or stool (black or red color)    Weakness of an arm or leg or 1 side of the face    Vision or hearing  changes    Trouble walking or speaking    Chest, arm, neck, back, or jaw pain  Date Last Reviewed: 11/1/2017 2000-2018 [employer]. [address] [place] [address], [place] [address]. All rights reserved. This information is not intended as a substitute for professional medical care. Always follow your healthcare professional's instructions.           Patient Education     Dizziness (Uncertain Cause)  Dizziness is a common symptom. It may be described as lightheadedness, spinning, or feeling like you are going to faint. Dizziness can have many causes.  Be sure to tell the healthcare provider about:    All medicines you take, including prescription, over-the-counter, herbs, and supplements    Any other symptoms you have    Any health problems you are being treated for    Any past major health problems you've had, such as a heart attack, balance issues, hearing problems, or blood pressure problems    Anything that causes the dizziness to get worse or better  Today's exam did not show an exact cause for your dizziness. Other tests may be needed. Follow up with your healthcare provider.  Home care    Dizziness that occurs with sudden standing may be a sign of mild dehydration. Drink extra fluids for the next few days.    If you recently started a new medicine, stopped a medicine, or had the dose of a current medicine changed, talk with the prescribing healthcare provider. Your medicine plan may need adjustment.    If dizziness lasts more than a few seconds, sit or lie down until it passes. This may help prevent injury in case you pass out. Get up slowly when you feel better.    Don't drive or use power tools or dangerous equipment until you have had no dizziness for at least 48 hours.  Follow-up care  Follow up with your healthcare provider for further evaluation within the next 7 days or as advised.  When to seek medical advice  Call your healthcare provider for any of the following:    Worsening of symptoms or  new symptoms    Passing out or seizure    Repeated vomiting    Headache    Palpitations (the sense that your heart is fluttering or beating fast or hard)    Shortness of breath    Blood in vomit or stool (black or red color)    Weakness of an arm or leg or 1 side of the face    Vision or hearing changes    Trouble walking or speaking    Chest, arm, neck, back, or jaw pain  Date Last Reviewed: 11/1/2017 2000-2018 [employer]. [address] [place] [id number]. All rights reserved. This information is not intended as a substitute for professional medical care. Always follow your healthcare professional's instructions.

## 2019-01-03 NOTE — PATIENT INSTRUCTIONS
Patient Education     Dizziness (Vertigo) and Balance Problems: Staying Safe     Replace burned-out light bulbs to keep your home safe and well lit.     Falls or accidents can lead to pain, broken bones, and fear of future falls. Protect yourself and others by preparing for episodes. Simple steps can help you stay safe at home and wherever you go.  Lighting  Keep all areas well lit. This helps your eyes send the right signals to the brain. It also makes you less likely to trip and fall. If bright lights make symptoms worse, dim the lights or lie in a dark room until the dizziness passes. Then turn the lights back to their normal level.  Tips:    Keep a flashlight by the bed.    Place nightlights in bathrooms and hallways.    Replace burned-out bulbs, or have someone replace them for you.  Preventing falls  To reduce your risk of falling:    Get out of bed or up from a chair slowly.    Wear low-heeled shoes that fit properly and have slip-resistant soles.    Remove throw rugs. Clear clutter from walkways.    Use handrails on stairs. Have handrails installed or adjusted if needed.    Install grab bars in the bathroom. Don't use towel racks for balance.    Use a shower stool. Also put adhesive strips in the shower or on the tub floor.  Going out  With a little time and preparation, you can get around safely.  Tips:    Bring a cane or walking aid if needed.    Give yourself plenty of time in case you start to get dizzy.    Ask your healthcare provider what type of exercise is safe for your condition.    Be patient. If an activity such as walking through a crowded shop causes you stress, you may not be ready for it yet.  Driving  If you become dizzy or disoriented while driving, you could hurt yourself and others. That's why it's best to not drive until symptoms have gone away. In some cases, your license may be temporarily held until it's safe for you to drive again.  For safety:    Ask a friend to drive for  you.    Take public transportation.    Walk to stores and other places when you can.  Asking for help  Don't be afraid to ask for help running errands, cooking meals, and doing exercise. Whether it's a friend, loved one, neighbor, or stranger on the street, a little help can make a world of difference.   Date Last Reviewed: 11/1/2016 2000-2018 [employer]. [address] [place] [address], [place] [id number]. All rights reserved. This information is not intended as a substitute for professional medical care. Always follow your healthcare professional's instructions.           Patient Education     Dizziness (Uncertain Cause)  Dizziness is a common symptom. It may be described as lightheadedness, spinning, or feeling like you are going to faint. Dizziness can have many causes.  Be sure to tell the healthcare provider about:    All medicines you take, including prescription, over-the-counter, herbs, and supplements    Any other symptoms you have    Any health problems you are being treated for    Any past major health problems you've had, such as a heart attack, balance issues, hearing problems, or blood pressure problems    Anything that causes the dizziness to get worse or better  Today's exam did not show an exact cause for your dizziness. Other tests may be needed. Follow up with your healthcare provider.  Home care    Dizziness that occurs with sudden standing may be a sign of mild dehydration. Drink extra fluids for the next few days.    If you recently started a new medicine, stopped a medicine, or had the dose of a current medicine changed, talk with the prescribing healthcare provider. Your medicine plan may need adjustment.    If dizziness lasts more than a few seconds, sit or lie down until it passes. This may help prevent injury in case you pass out. Get up slowly when you feel better.    Don't drive or use power tools or dangerous equipment until you have had no dizziness for at least 48  hours.  Follow-up care  Follow up with your healthcare provider for further evaluation within the next 7 days or as advised.  When to seek medical advice  Call your healthcare provider for any of the following:    Worsening of symptoms or new symptoms    Passing out or seizure    Repeated vomiting    Headache    Palpitations (the sense that your heart is fluttering or beating fast or hard)    Shortness of breath    Blood in vomit or stool (black or red color)    Weakness of an arm or leg or 1 side of the face    Vision or hearing changes    Trouble walking or speaking    Chest, arm, neck, back, or jaw pain  Date Last Reviewed: 11/1/2017 2000-2018 [employer]. [address] [place] [id number]. All rights reserved. This information is not intended as a substitute for professional medical care. Always follow your healthcare professional's instructions.           Patient Education     Dizziness (Uncertain Cause)  Dizziness is a common symptom. It may be described as lightheadedness, spinning, or feeling like you are going to faint. Dizziness can have many causes.  Be sure to tell the healthcare provider about:    All medicines you take, including prescription, over-the-counter, herbs, and supplements    Any other symptoms you have    Any health problems you are being treated for    Any past major health problems you've had, such as a heart attack, balance issues, hearing problems, or blood pressure problems    Anything that causes the dizziness to get worse or better  Today's exam did not show an exact cause for your dizziness. Other tests may be needed. Follow up with your healthcare provider.  Home care    Dizziness that occurs with sudden standing may be a sign of mild dehydration. Drink extra fluids for the next few days.    If you recently started a new medicine, stopped a medicine, or had the dose of a current medicine changed, talk with the prescribing healthcare provider. Your medicine  plan may need adjustment.    If dizziness lasts more than a few seconds, sit or lie down until it passes. This may help prevent injury in case you pass out. Get up slowly when you feel better.    Don't drive or use power tools or dangerous equipment until you have had no dizziness for at least 48 hours.  Follow-up care  Follow up with your healthcare provider for further evaluation within the next 7 days or as advised.  When to seek medical advice  Call your healthcare provider for any of the following:    Worsening of symptoms or new symptoms    Passing out or seizure    Repeated vomiting    Headache    Palpitations (the sense that your heart is fluttering or beating fast or hard)    Shortness of breath    Blood in vomit or stool (black or red color)    Weakness of an arm or leg or 1 side of the face    Vision or hearing changes    Trouble walking or speaking    Chest, arm, neck, back, or jaw pain  Date Last Reviewed: 11/1/2017 2000-2018 [employer]. [address] [place], [place] [id number]. All rights reserved. This information is not intended as a substitute for professional medical care. Always follow your healthcare professional's instructions.

## 2019-06-29 DIAGNOSIS — Z71.6 ENCOUNTER FOR SMOKING CESSATION COUNSELING: ICD-10-CM

## 2019-06-30 NOTE — TELEPHONE ENCOUNTER
"Requested Prescriptions   Pending Prescriptions Disp Refills     buPROPion (WELLBUTRIN SR) 150 MG 12 hr tablet [Pharmacy Med Name: BUPROPION HCL  MG TABLET] 60 tablet 2     Sig: TAKE 1 TABLET BY MOUTH ONCE DAILY AND INCREASE TO 1 TABLET TWICE DAILY AFTER 4 TO 7 DAYS         Last Written Prescription Date:  11/6/18  Last Fill Quantity: 60,   # refills: 2  Last Office Visit: 1/3/19  Future Office visit:       Routing refill request to provider for review/approval because:  Dx smoking cessation      SSRIs Protocol Passed - 6/29/2019  8:30 AM        Passed - Recent (12 mo) or future (30 days) visit within the authorizing provider's specialty     Patient had office visit in the last 12 months or has a visit in the next 30 days with authorizing provider or within the authorizing provider's specialty.  See \"Patient Info\" tab in inbasket, or \"Choose Columns\" in Meds & Orders section of the refill encounter.              Passed - Medication is Bupropion     If the medication is Bupropion (Wellbutrin), and the patient is taking for smoking cessation; OK to refill.          Passed - Medication is active on med list        Passed - Patient is age 18 or older        Passed - No active pregnancy on record        Passed - No positive pregnancy test in last 12 months          "

## 2019-07-01 RX ORDER — BUPROPION HYDROCHLORIDE 150 MG/1
TABLET, EXTENDED RELEASE ORAL
Qty: 60 TABLET | Refills: 2 | Status: SHIPPED | OUTPATIENT
Start: 2019-07-01

## 2019-11-08 ENCOUNTER — HEALTH MAINTENANCE LETTER (OUTPATIENT)
Age: 29
End: 2019-11-08

## 2020-02-23 ENCOUNTER — HEALTH MAINTENANCE LETTER (OUTPATIENT)
Age: 30
End: 2020-02-23

## 2020-06-05 ENCOUNTER — VIRTUAL VISIT (OUTPATIENT)
Dept: FAMILY MEDICINE | Facility: OTHER | Age: 30
End: 2020-06-05

## 2020-06-05 NOTE — PROGRESS NOTES
"Date: 2020 09:56:54  Clinician: Barbara Power  Clinician NPI: [id number]  Patient: Rekha Michael  Patient : 1990  Patient Address: [address] [place] [address]  Patient Phone: [phone]  Visit Protocol: URI  Patient Summary:  Rekha is a 29 year old ( : 1990 ) female who initiated a Visit for COVID-19 (Coronavirus) evaluation and screening. When asked the question \"Please sign me up to receive news, health information and promotions. \", Rekha responded \"No\".    Rekha states her symptoms started 1-2 days ago.   Rekha does not have any symptoms. Rekha denies having wheezing, nausea, teeth pain, ageusia, diarrhea, sore throat, enlarged lymph nodes, malaise, myalgias, anosmia, facial pain or pressure, fever, cough, nasal congestion, vomiting, rhinitis, ear pain, headache, and chills. She also denies having recent facial or sinus surgery in the past 60 days and taking antibiotic medication for the symptoms. She is not experiencing dyspnea.    Pertinent COVID-19 (Coronavirus) information  In the past 14 days, Rekha has not worked in a congregate living setting.   She does not work or volunteer as healthcare worker or a  and does not work or volunteer in a healthcare facility.   Rekha also has not lived in a congregate living setting in the past 14 days. She lives with a healthcare worker.   Rekha has not had a close contact with a laboratory-confirmed COVID-19 patient within 14 days of symptom onset.   Pertinent medical history  Rekha typically gets a yeast infection when she takes antibiotics. She has used fluconazole (Diflucan) to treat previous yeast infections. 1 dose of fluconazole (Diflucan) has typically been sufficient for symptoms to resolve in the past.   Rekha does not need a return to work/school note.   Weight: 195 lbs   Rekha smokes or uses smokeless tobacco.   She denies pregnancy and denies breastfeeding. " She does not menstruate.   Additional information as reported by the patient (free text): I went to [employer] last wednesday through friday. I have had an itchy rash on my abdomen over the past 2 days. [facility] recommended testing for anyone who went to the [facility].   Weight: 195 lbs    MEDICATIONS: Imitrex oral, Aleve oral, Zofran oral, triamcinolone acetonide topical, ALLERGIES: NKDA  Clinician Response:  Dear Rekha,  I am sorry you are not feeling well. Your health is our priority. Based on the information you have provided, it is possible that you may have some type of viral infection.  Please read the full treatment plan and see my recommendations below.  Medication information  Because you have a viral infection, antibiotics will not help you get better. Treating a viral infection with antibiotics could actually make you feel worse.  Self care  Steps you can take to be as comfortable as possible:     Rest.    Drink plenty of fluids.     Also, as your provider, I need you to know that becoming tobacco-free is the most important thing you can do to protect your current and future health.  Additional treatment plan   [facility] is not testing asymptomatic patients as you can develop the virus up to 14 days from exposure. I would have you self quarantine for 14 days if you have had known exposure. Follow up to get further evaluated if any symptoms develop.     Diagnosis: Rash and other nonspecific skin eruption  Diagnosis ICD: R21

## 2020-07-22 ENCOUNTER — VIRTUAL VISIT (OUTPATIENT)
Dept: FAMILY MEDICINE | Facility: OTHER | Age: 30
End: 2020-07-22

## 2020-07-22 NOTE — PROGRESS NOTES
"Date: 2020 14:27:02  Clinician: Myles Morel  Clinician NPI: [id number]  Patient: Rekha Michael  Patient : 1990  Patient Address: [address] [place] [address]  Patient Phone: [phone]  Visit Protocol: URI  Patient Summary:  Rekha is a 29 year old ( : 1990 ) female who initiated a Visit for COVID-19 (Coronavirus) evaluation and screening. When asked the question \"Please sign me up to receive news, health information and promotions. \", Rekha responded \"No\".    Rekha states her symptoms started gradually 3-4 days ago.   Her symptoms consist of nausea, a cough, malaise, and a headache. Rekha also feels feverish.   Symptom details     Cough: Rekha coughs every 5-10 minutes and her cough is more bothersome at night. Phlegm does not come into her throat when she coughs. She does not believe her cough is caused by post-nasal drip.     Temperature: Her current temperature is 99 degrees Fahrenheit.     Headache: She states the headache is moderate (4-6 on a 10 point pain scale).      Rekha denies having wheezing, teeth pain, ageusia, diarrhea, myalgias, sore throat, anosmia, facial pain or pressure, nasal congestion, vomiting, rhinitis, ear pain, chills, and enlarged lymph nodes. She also denies having recent facial or sinus surgery in the past 60 days, double sickening (worsening symptoms after initial improvement), taking antibiotic medication in the past month, and having a sinus infection within the past year. She is not experiencing dyspnea.   Precipitating events  She has not recently been exposed to someone with influenza. Rekha has been in close contact with the following high risk individuals: people with asthma, heart disease or diabetes.   Pertinent COVID-19 (Coronavirus) information  In the past 14 days, Rekha has not worked in a congregate living setting.   She does not work or volunteer as healthcare worker or a  and does " not work or volunteer in a healthcare facility.   Rekha also has not lived in a congregate living setting in the past 14 days. She does not live with a healthcare worker.   Rekha has not had a close contact with a laboratory-confirmed COVID-19 patient within 14 days of symptom onset.   Pertinent medical history  Rekha typically gets a yeast infection when she takes antibiotics. She has used fluconazole (Diflucan) to treat previous yeast infections. 2 doses of fluconazole (Diflucan) has typically been needed for symptoms to resolve in the past.  Rekha needs a return to work/school note.   Weight: 190 lbs   Rekha does not smoke or use smokeless tobacco.   She denies pregnancy and denies breastfeeding. She does not menstruate.   Weight: 190 lbs    MEDICATIONS: Zofran oral, Aleve oral, Imitrex oral, ALLERGIES: NKDA  Clinician Response:  Dear Rekha,   Your symptoms show that you may have coronavirus (COVID-19). This illness can cause fever, cough and trouble breathing. Many people get a mild case and get better on their own. Some people can get very sick.  What should I do?  We would like to test you for this virus.   1. Please call [phone] to schedule your visit. Explain that you were referred by [facility] to have a COVID-19 test. Be ready to share your [facility] visit ID number.  The following will serve as your written order for this COVID Test, ordered by me, for the indication of suspected COVID [Z20.828]: The test will be ordered in [employer], our electronic health record, after you are scheduled. It will show as ordered and authorized by Pepe Garcia MD.  Order: COVID-19 (Coronavirus) PCR for SYMPTOMATIC testing from [facility].      2. When it's time for your COVID test:  Stay at least 6 feet away from others. (If someone will drive you to your test, stay in the backseat, as far away from the  as you can.)   Cover your mouth and nose with a mask, tissue or washcloth.  Go straight to the testing  "site. Don't make any stops on the way there or back.      3.Starting now: Stay home and away from others (self-isolate) until:   You've had no fever---and no medicine that reduces fever---for 3 full days (72 hours). And...   Your other symptoms have gotten better. For example, your cough or breathing has improved. And...   At least 10 days have passed since your symptoms started.       During this time, don't leave the house except for testing or medical care.   Stay in your own room, even for meals. Use your own bathroom if you can.   Stay away from others in your home. No hugging, kissing or shaking hands. No visitors.  Don't go to work, school or anywhere else.    Clean \"high touch\" surfaces often (doorknobs, counters, handles, etc.). Use a household cleaning spray or wipes. You'll find a full list of  on the EPA website: www.epa.gov/pesticide-registration/list-n-disinfectants-use-against-sars-cov-2.   Cover your mouth and nose with a mask, tissue or washcloth to avoid spreading germs.  Wash your hands and face often. Use soap and water.  Caregivers in these groups are at risk for severe illness due to COVID-19:  o People 65 years and older  o People who live in a nursing home or long-term care facility  o People with chronic disease (lung, heart, cancer, diabetes, kidney, liver, immunologic)  o People who have a weakened immune system, including those who:   Are in cancer treatment  Take medicine that weakens the immune system, such as corticosteroids  Had a bone marrow or organ transplant  Have an immune deficiency  Have poorly controlled HIV or AIDS  Are obese (body mass index of 40 or higher)  Smoke regularly   o Caregivers should wear gloves while washing dishes, handling laundry and cleaning bedrooms and bathrooms.  o Use caution when washing and drying laundry: Don't shake dirty laundry, and use the warmest water setting that you can.  o For more tips, go to " www.cdc.gov/coronavirus/2019-ncov/downloads/10Things.pdf.    4.Sign up for [employer]. We know it's scary to hear that you might have COVID-19. We want to track your symptoms to make sure you're okay over the next 2 weeks. Please look for an email from [employer]---this is a free, online program that we'll use to keep in touch. To sign up, follow the link in the email. Learn more at [email or web address]  How can I take care of myself?   Get lots of rest. Drink extra fluids (unless a doctor has told you not to).   Take Tylenol (acetaminophen) for fever or pain. If you have liver or kidney problems, ask your family doctor if it's okay to take Tylenol.   Adults can take either:    650 mg (two 325 mg pills) every 4 to 6 hours, or...   1,000 mg (two 500 mg pills) every 8 hours as needed.    Note: Don't take more than 3,000 mg in one day. Acetaminophen is found in many medicines (both prescribed and over-the-counter medicines). Read all labels to be sure you don't take too much.   For children, check the Tylenol bottle for the right dose. The dose is based on the child's age or weight.    If you have other health problems (like cancer, heart failure, an organ transplant or severe kidney disease): Call your specialty clinic if you don't feel better in the next 2 days.       Know when to call 911. Emergency warning signs include:    Trouble breathing or shortness of breath Pain or pressure in the chest that doesn't go away Feeling confused like you haven't felt before, or not being able to wake up Bluish-colored lips or face.  Where can I get more information?    [facility] -- About COVID-19: [email or web address]   CDC -- What to Do If You're Sick: www.cdc.gov/coronavirus/2019-ncov/about/steps-when-sick.html   CDC -- Ending Home Isolation: www.cdc.gov/coronavirus/2019-ncov/hcp/disposition-in-home-patients.html   CDC -- Caring for Someone:  www.cdc.gov/coronavirus/2019-ncov/if-you-are-sick/care-for-someone.html   [facility] -- Interim Guidance for Hospital Discharge to Home: www.health.[facility].[place]/diseases/coronavirus/hcp/hospdischarge.pdf   [facility] clinical trials (COVID-19 research studies): clinicalaffairs.[facility]-clinical-trials    Below are the COVID-19 hotlines at the [place] Department [facility] ([facility]). Interpreters are available.    For health questions: Call 805-602-4980 or 1-461.931.2966 (7 a.m. to 7 p.m.) For questions about schools and childcare: Call 823-505-0929 or 1-601.738.8457 (7 a.m. to 7 p.m.)    Diagnosis: Other malaise  Diagnosis ICD: R53.81

## 2020-07-24 DIAGNOSIS — Z20.822 SUSPECTED 2019 NOVEL CORONAVIRUS INFECTION: Primary | ICD-10-CM

## 2020-07-25 LAB
SARS-COV-2 RNA SPEC QL NAA+PROBE: NOT DETECTED
SPECIMEN SOURCE: NORMAL

## 2020-12-06 ENCOUNTER — HEALTH MAINTENANCE LETTER (OUTPATIENT)
Age: 30
End: 2020-12-06

## 2021-04-11 ENCOUNTER — HEALTH MAINTENANCE LETTER (OUTPATIENT)
Age: 31
End: 2021-04-11

## 2021-04-21 ENCOUNTER — PRE VISIT (OUTPATIENT)
Dept: NEUROLOGY | Facility: CLINIC | Age: 31
End: 2021-04-21

## 2021-04-21 NOTE — TELEPHONE ENCOUNTER
FUTURE VISIT INFORMATION      FUTURE VISIT INFORMATION:    Date: 4/26/2021    Time: 230m    Location: [facility]  REFERRAL INFORMATION:    Referring provider:  Self     Referring providers clinic:      Reason for visit/diagnosis  Neuropathy     RECORDS REQUESTED FROM:       Clinic name Comments Records Status Imaging Status   [facility]  CT Head-11/27/2020 Care Everywhere Requested to PACS                                   4/21/2021-Request for Images faxed to [facility]-MR @ 721am    4/23/2021-[facility] Images now in PACS-MR @ 731am

## 2021-04-26 ENCOUNTER — OFFICE VISIT (OUTPATIENT)
Dept: NEUROLOGY | Facility: CLINIC | Age: 31
End: 2021-04-26
Payer: COMMERCIAL

## 2021-04-26 VITALS
WEIGHT: 189 LBS | RESPIRATION RATE: 16 BRPM | SYSTOLIC BLOOD PRESSURE: 120 MMHG | DIASTOLIC BLOOD PRESSURE: 78 MMHG | HEART RATE: 95 BPM | BODY MASS INDEX: 32.95 KG/M2 | OXYGEN SATURATION: 98 %

## 2021-04-26 DIAGNOSIS — R41.89 COGNITIVE DECLINE: Primary | ICD-10-CM

## 2021-04-26 PROCEDURE — 99205 OFFICE O/P NEW HI 60 MIN: CPT | Performed by: PSYCHIATRY & NEUROLOGY

## 2021-04-26 RX ORDER — KETOCONAZOLE 20 MG/ML
1 SHAMPOO TOPICAL
COMMUNITY
Start: 2021-04-02

## 2021-04-26 RX ORDER — DULOXETIN HYDROCHLORIDE 30 MG/1
30 CAPSULE, DELAYED RELEASE ORAL
COMMUNITY
Start: 2021-04-01

## 2021-04-26 RX ORDER — LISDEXAMFETAMINE DIMESYLATE 40 MG/1
40 CAPSULE ORAL
COMMUNITY
Start: 2021-02-02

## 2021-04-26 RX ORDER — NAPROXEN SODIUM 220 MG
220-440 TABLET ORAL
COMMUNITY
Start: 2021-04-07

## 2021-04-26 RX ORDER — ONDANSETRON 8 MG/1
8 TABLET, FILM COATED ORAL
COMMUNITY
Start: 2019-08-26

## 2021-04-26 RX ORDER — ACETAMINOPHEN 325 MG/1
325 TABLET ORAL
COMMUNITY

## 2021-04-26 RX ORDER — CYCLOBENZAPRINE HCL 10 MG
10 TABLET ORAL
COMMUNITY
Start: 2021-02-01

## 2021-04-26 RX ORDER — DIAZEPAM 5 MG
5 TABLET ORAL
COMMUNITY
Start: 2021-02-01

## 2021-04-26 ASSESSMENT — ENCOUNTER SYMPTOMS
DIZZINESS: 1
FEVER: 0
HYPERTENSION: 0
NERVOUS/ANXIOUS: 0
TINGLING: 1
HOARSE VOICE: 0
NECK PAIN: 1
BRUISES/BLEEDS EASILY: 1
HEADACHES: 1
LOSS OF CONSCIOUSNESS: 1
BACK PAIN: 1
DEPRESSION: 0
EXERCISE INTOLERANCE: 1
HYPOTENSION: 0
WEIGHT LOSS: 0
RECTAL PAIN: 0
WEIGHT GAIN: 0
POLYPHAGIA: 0
MEMORY LOSS: 1
SPEECH CHANGE: 1
MUSCLE WEAKNESS: 1
CHILLS: 1
MUSCLE CRAMPS: 1
BOWEL INCONTINENCE: 0
PALPITATIONS: 1
EYE PAIN: 1
JOINT SWELLING: 0
SINUS CONGESTION: 0
INCREASED ENERGY: 1
POOR WOUND HEALING: 1
WEAKNESS: 1
MYALGIAS: 1
SORE THROAT: 0
SEIZURES: 0
NIGHT SWEATS: 0
DECREASED CONCENTRATION: 1
PANIC: 0
NUMBNESS: 0
TASTE DISTURBANCE: 0
SYNCOPE: 0
HALLUCINATIONS: 0
EYE REDNESS: 1
LEG PAIN: 1
SLEEP DISTURBANCES DUE TO BREATHING: 0
INSOMNIA: 1
EYE WATERING: 0
NECK MASS: 1
ORTHOPNEA: 0
BLOOD IN STOOL: 0
SINUS PAIN: 0
NAUSEA: 1
CONSTIPATION: 0
PARALYSIS: 0
SKIN CHANGES: 0
ABDOMINAL PAIN: 1
JAUNDICE: 0
FATIGUE: 1
LIGHT-HEADEDNESS: 1
HOT FLASHES: 1
SMELL DISTURBANCE: 0
ALTERED TEMPERATURE REGULATION: 1
POLYDIPSIA: 0
VOMITING: 0
TREMORS: 1
NAIL CHANGES: 0
SWOLLEN GLANDS: 1
DECREASED APPETITE: 0
STIFFNESS: 1
HEARTBURN: 0
EYE IRRITATION: 1
DECREASED LIBIDO: 1
DIARRHEA: 0
DOUBLE VISION: 0
BLOATING: 1
ARTHRALGIAS: 1
DISTURBANCES IN COORDINATION: 1
TROUBLE SWALLOWING: 1

## 2021-04-26 ASSESSMENT — PAIN SCALES - GENERAL: PAINLEVEL: MILD PAIN (3)

## 2021-04-26 NOTE — LETTER
4/26/2021       RE: Rekha Michael  [address]  [facility] [address]     Dear Colleague,    Thank you for referring your patient, Rekha Michael, to the [facility] NEUROLOGY CLINIC [place] at [facility]. Please see a copy of my visit note below.    [facility] Neurology Consultation    Rekha Michael MRN# [id number]   Age: 30 year old YOB: 1990     Requesting physician: Referred Self     Reason for Consultation: cognitive impairment      History of Presenting Symptoms:   Rekha Michael is a 30 year old female who presents today for evaluation of cognitive impairment.    The patient reported increased fatigue and mild weight loss since 10/2020. She was seen with rheumatology, 2/15/2021, who noted slightly elevated ESR but otherwise normal testing done previously.  Testing through Rheumatology led finding of elevated TPO stefany, as well as review of imaging showing lymphadenopathy.  2/25/2021, Oncology evaluation of lymphadenopathy was unrevealing (CT scan, biopsy R-cervical neck lymph node), and there was minimal concern for a oncology process.  The patient was seen with [facility] through [facility] 4/7/2021, where a well documented timeline is as follows:   -Nov 2020: Noted swollen lymph nodes on left neck and occiptal, ear pain left ear with intermittent tinnitus.  -lost 20lbs initially, but has maintained at 180lbs since then.  -lack of appetite, was worse December 2020. Unsure if related to ADHD meds? Started meds Nov 2020-> less brain fog.  -scalp lesions- did a course of bactrim (no relief) and was referred to dermatology.  -Thanksgiving 2020: has terrible migraine and 2 visual hallucinations-> brain imaging (head CT) and lab work at ER unremarkable.    -saw ENT. Normal audiology test.  MRI auditory canals normal.  -saw infectious disease and negative workup thus far (other than previous EBV and vit D deficiency>  repleted).  -Reports extensive work-up including unremarkable labs for Lyme disease, mono, HIV, Bartonella, toxoplasma, Covid, inflammatory markers, autoimmune work-up, peripheral smear).  - Had MRI of brain December 2020 (I cannot verify this imaging, or report)  -January 2021, now unable to work-> on medical leave.  -January 2021 CT chest, abdomen, pelvis and FNA biopsy of cervical lymph node.  -February 2021 skin biopsy. Saw dermatology-> dx prurigo nodularis. But Rekha does not feel her sx are consistent with this. Tried T-cell shampoo without relief. Tried kenalog injection-> resolved that one lesion, but caused skin atrophy.  -Noted frontal hair loss/receeding hairline.   -Biopsy of shoulder lesion 02/2021: Dermal interstitial histiocytes.     Following that visit, the patient was to trial low dose naltrexone, obtain labs (zince, copper, ferritin, B6, B12, Folic acid, Vitamin D, FSH, progesterone, estradiol, testosterone, DHEA-S), continue wot follow up with psychiatrist, and see acupuncture for other treatment.    Today, the patient reports that her fatigue has worsened since 11/2020, such that she is in bed for days instead of hours.  Her neck has started to have spasms, mostly on the left side (feels like she is a bobble head due to instability).  Her dizziness upon standing is worsened.  She is having a greater degree of joint pain.  She feels that her temperature is difficult to control.  Her hands feel cold and can cramp.  She feels that she is bumping into things more often, and dropping things when she does want to.  Her brain fog is described as difficulty following complex ordered tasks, poor focus and concentration such that she needs frequent brakes to remind her on the task at hand.  She feels like she is watching herself go through life, and is having difficulty remaining active in participation with life in general.  When she stands quickly she may feel light-headed, ut doesn't necessarily  have tachycardia.      Regarding hallucinations, the patient reports that in 11/2020 she had visions that the carpet was wavy for brief periods of time.  A week or two prior to the hallucinations, the patient was started on Adderall.  The patient's sister had unexplained seizures as a teenager, and was diagnosed with Nikhil Danlos Syndrome.   The patient never had a history of meningitis/encephalitis, brain trauma, febrile seizures, or developmental delay.  In 02/2020 the patient had a severe flu or strep throat, resulting in recurrent migraines, body aches, odynophagia, but no respiratory issues or throat pain.      Past Medical History:   Generalized anxiety disorder  MDD  Mixed/nondepdented drug abuse (inremission)  Endometriosis  Migraine     Past Surgical History:     Past Surgical History:   Procedure Laterality Date     LAPAROSCOPIC CYSTECTOMY OVARIAN (BENIGN) Bilateral 9/12/2016    Procedure: LAPAROSCOPIC CYSTECTOMY OVARIAN (BENIGN);  Surgeon: Federico Maria MD;  Location: [facility]     LAPAROSCOPY DIAGNOSTIC (GENERAL)       MOUTH SURGERY        Social History:   Prior history of polydrug overdose (8 Percocet, 8 trazodone, 8 Ativan and a full glass of tequila. 5/28/2009 notes from IM).   Tobacco Use     Smoking status: Current Some Day Smoker     Packs/day: 0.50     Types: Cigarettes     Smokeless tobacco: Never Used   Substance Use Topics     Alcohol use: Yes     Drug use: No      Family History:   Sister with focal seizures possibly, and EDS     Medications:     Current Outpatient Medications   Medication Sig     buPROPion (WELLBUTRIN SR) 150 MG 12 hr tablet TAKE 1 TABLET BY MOUTH ONCE DAILY AND INCREASE TO 1 TABLET TWICE DAILY AFTER 4 TO 7 DAYS     norethindrone-ethinyl estradiol (MICROGESTIN 1/20) 1-20 MG-MCG tablet Take 1 tablet by mouth daily     Ondansetron (ZOFRAN ODT PO) Take 4 mg by mouth every 8 hours as needed for nausea     oxyCODONE-acetaminophen (PERCOCET) 5-325 MG per tablet Take 1 tablet by  mouth every 6 hours as needed for moderate to severe pain     SUMAtriptan (IMITREX) 50 MG tablet 50 mg at the onset of the headache, can repeat every 2 hours for a maximum daily dose of 200 mg   - Adderall XR 20 mg every day     Allergies:   No Known Allergies     Review of Systems:   A comprehensive 10 point review of systems (constitutional, ENT, cardiac, peripheral vascular, lymphatic, respiratory, GI, , Musculoskeletal, skin, Neurological) was performed and found to be negative except as described in this note.      Physical Exam:   Vitals: /78   Pulse 95   Resp 16   Wt 85.7 kg (189 lb)   SpO2 98%   BMI 32.95 kg/m     General: Seated comfortably in no acute distress.  HEENT: Neck supple with normal range of motion. No paracervical muscle tenderness or tightness.  Optic discs sharp and vasculature normal on funduscopic exam.   Heart: Regular rate  Lungs: breathing comfortably  GI: Non tender  Extremities: no edema  Skin: No rashes  Neurologic:     Mental Status: Fully alert, attentive and oriented. Speech clear and fluent, no paraphasic errors. MiniCog was 3/5 (recalled one word out of 3, clock appropriate). Word production of 20+ words that start with F.  Serial 7's to 2 subtractions correctly, but then multiple errors following. Cube copy appropriate. Recalls 9/11 accurately (place, year, events). Luria to 3 repeats.  Sentence repetition appropriate and without substitutions or errors.     Cranial Nerves: Visual fields intact. PERRL. EOMI with normal smooth pursuit. Facial sensation intact/symmetric. Facial movements symmetric. Hearing not formally tested but intact to conversation. Palate elevation symmetric, uvula midline. No dysarthria. Shoulder shrug strong bilaterally. Tongue protrusion midline.     Motor: No tremors or other abnormal movements observed. Muscle tone normal throughout. No pronator drift. Normal/symmetric rapid finger tapping. Strength 5/5 throughout upper and lower  extremities.     Deep Tendon Reflexes: 2+/symmetric throughout upper and lower extremities. No clonus. Toes downgoing bilaterally.     Sensory: Intact/symmetric to light touch, pinprick, temperature, vibration throughout upper and lower extremities. Negative Romberg.      Coordination: Finger-nose-finger intact without dysmetria. Rapid alternating movements intact/symmetric with normal speed and rhythm.     Gait: Normal, steady casual gait. Able to walk on toes, heels and tandem without difficulty.         Data: Pertinent prior to visit   Imaging:  CT head: 11/27/2020  IMPRESSION:  Unremarkable noncontrast head CT.    CT neck: soft tissue: 11/27/2020  Notable lymph nodes measuring up to 11 mm bilaterally at level IIa (series 2, image 43 and 45), 12 mm left level IIb (series 2, image 40), and 13 mm right level IIb (series 2, image 54). The aforementioned lymph nodes demonstrate elongated morphology.  IMPRESSION:  1. Multiple borderline and mildly enlarged level II lymph nodes are not significantly changed compared to the CT dated 11/27/2020. Though nonspecific, these lymph nodes demonstrate elongated morphology most typical of a reactive etiology.    Laboratory:  - ESR 16, TPO - elevated  - SSA, SSB, Celiac cascade, CRP, DENNISE, HIV, Quant Gold, Lyme, Treponema, Ferritin, LD - normal         Assessment and Plan:   Assessment:  Cognitive impairment   - poor attention, short term recall    The patient has multiple symptoms that are seemingly diffuse and has a relatively normal neurological examination today regarding signs for peripheral neuropathies (small fiber, large fiber, radiculopathy).  She has had reported nromal MRI brain in the past, and her symptoms do not localize to the spinal cord.  It is unclear as to why the patient developed an acute onset of symptoms in 11/2020, but it is notable that she has had elevated TPO stefany (can be incidental), as well as seemingly reactive lymph nodes.  It is possible the patient  has some degree of a viral infection leading to reactive inflammatory response and we are seeing the atypical presentations of this condition regarding her symptoms of cognitive dysfunction.  While anxiety/stress certainly are playing a role in her deficits, I am not to certain that these are the etiology of her deficits noted today.  With her reported MRI brain being normal, it is unlikely there is an ongoing demyelinating process, or primary brain tumor at play here.  However, Neuropsychology evaluation would be helpful to see if there is truly a focal cognitive domain that has a deficit versus other explanations (somatization, depression, anxiety), as this would help support the need for alternative investigations (repeat imaging, repeat serum studies with expanded testing relating to vasculitis/paraneoplastic etiologies/endocrine).     Plan:  - Neuropsychology referral   - Obtain MRI brain from outside  -  for ongoing difficulties with finances due to work disability    Follow up in Neurology clinic in 4 weeks (video to discuss imaging which the patient will obtain for my review)  or earlier as needed should new concerns arise.    TONY Horne D.O.   of Neurology    Total time (78 min) in this patient encounter was spent on pre-charting, counseling and/or coordination of care. We reviewed diagnostic results, impressions, and discussed other possible tests if symptoms do not improve. We discussed the implications of the diagnosis, as well as risks and benefits of management options. We reviewed treatment instructions and our scheduled follow-up as specified in the discharge plan. We also discussed the importance of compliance with the chosen course of treatment. The patient is in agreement with this plan and has no further questions.

## 2021-04-26 NOTE — PATIENT INSTRUCTIONS
You need to have neuropsychological testing, along with a  consultation for your ongoing symptoms.  I do think you have a reactive issue ongoing from a prior viral infection, and will need to see your MRI brain to make sure there are no central lesions (injuries) characteristic for disorders that could mimic this.  After seeing the MRI, I will determine if an cervical MRI is needed alone or with a new image of the brain.

## 2021-04-26 NOTE — PROGRESS NOTES
[facility] Neurology Consultation    Rekha Michael MRN# [id number]   Age: 30 year old YOB: 1990     Requesting physician: Referred Self     Reason for Consultation: cognitive impairment      History of Presenting Symptoms:   Rekha Michael is a 30 year old female who presents today for evaluation of cognitive impairment.    The patient reported increased fatigue and mild weight loss since 10/2020. She was seen with rheumatology, 2/15/2021, who noted slightly elevated ESR but otherwise normal testing done previously.  Testing through Rheumatology led finding of elevated TPO stefany, as well as review of imaging showing lymphadenopathy.  2/25/2021, Oncology evaluation of lymphadenopathy was unrevealing (CT scan, biopsy R-cervical neck lymph node), and there was minimal concern for a oncology process.  The patient was seen with [facility] through [facility] 4/7/2021, where a well documented timeline is as follows:   -Nov 2020: Noted swollen lymph nodes on left neck and occiptal, ear pain left ear with intermittent tinnitus.  -lost 20lbs initially, but has maintained at 180lbs since then.  -lack of appetite, was worse December 2020. Unsure if related to ADHD meds? Started meds Nov 2020-> less brain fog.  -scalp lesions- did a course of bactrim (no relief) and was referred to dermatology.  -Thanksgiving 2020: has terrible migraine and 2 visual hallucinations-> brain imaging (head CT) and lab work at ER unremarkable.    -saw ENT. Normal audiology test.  MRI auditory canals normal.  -saw infectious disease and negative workup thus far (other than previous EBV and vit D deficiency> repleted).  -Reports extensive work-up including unremarkable labs for Lyme disease, mono, HIV, Bartonella, toxoplasma, Covid, inflammatory markers, autoimmune work-up, peripheral smear).  - Had MRI of brain December 2020 (I cannot verify this imaging, or report)  -January 2021, now unable to work-> on medical leave.  -January  2021 CT chest, abdomen, pelvis and FNA biopsy of cervical lymph node.  -February 2021 skin biopsy. Saw dermatology-> dx prurigo nodularis. But Rekha does not feel her sx are consistent with this. Tried T-cell shampoo without relief. Tried kenalog injection-> resolved that one lesion, but caused skin atrophy.  -Noted frontal hair loss/receeding hairline.   -Biopsy of shoulder lesion 02/2021: Dermal interstitial histiocytes.     Following that visit, the patient was to trial low dose naltrexone, obtain labs (zince, copper, ferritin, B6, B12, Folic acid, Vitamin D, FSH, progesterone, estradiol, testosterone, DHEA-S), continue wot follow up with psychiatrist, and see acupuncture for other treatment.    Today, the patient reports that her fatigue has worsened since 11/2020, such that she is in bed for days instead of hours.  Her neck has started to have spasms, mostly on the left side (feels like she is a bobble head due to instability).  Her dizziness upon standing is worsened.  She is having a greater degree of joint pain.  She feels that her temperature is difficult to control.  Her hands feel cold and can cramp.  She feels that she is bumping into things more often, and dropping things when she does want to.  Her brain fog is described as difficulty following complex ordered tasks, poor focus and concentration such that she needs frequent brakes to remind her on the task at hand.  She feels like she is watching herself go through life, and is having difficulty remaining active in participation with life in general.  When she stands quickly she may feel light-headed, ut doesn't necessarily have tachycardia.      Regarding hallucinations, the patient reports that in 11/2020 she had visions that the carpet was wavy for brief periods of time.  A week or two prior to the hallucinations, the patient was started on Adderall.  The patient's sister had unexplained seizures as a teenager, and was diagnosed with Nikhil  Danlos Syndrome.   The patient never had a history of meningitis/encephalitis, brain trauma, febrile seizures, or developmental delay.  In 02/2020 the patient had a severe flu or strep throat, resulting in recurrent migraines, body aches, odynophagia, but no respiratory issues or throat pain.      Past Medical History:   Generalized anxiety disorder  MDD  Mixed/nondepdented drug abuse (inremission)  Endometriosis  Migraine     Past Surgical History:     Past Surgical History:   Procedure Laterality Date     LAPAROSCOPIC CYSTECTOMY OVARIAN (BENIGN) Bilateral 9/12/2016    Procedure: LAPAROSCOPIC CYSTECTOMY OVARIAN (BENIGN);  Surgeon: Federico Maria MD;  Location: [facility]     LAPAROSCOPY DIAGNOSTIC (GENERAL)       MOUTH SURGERY        Social History:   Prior history of polydrug overdose (8 Percocet, 8 trazodone, 8 Ativan and a full glass of tequila. 5/28/2009 notes from ).   Tobacco Use     Smoking status: Current Some Day Smoker     Packs/day: 0.50     Types: Cigarettes     Smokeless tobacco: Never Used   Substance Use Topics     Alcohol use: Yes     Drug use: No      Family History:   Sister with focal seizures possibly, and EDS     Medications:     Current Outpatient Medications   Medication Sig     buPROPion (WELLBUTRIN SR) 150 MG 12 hr tablet TAKE 1 TABLET BY MOUTH ONCE DAILY AND INCREASE TO 1 TABLET TWICE DAILY AFTER 4 TO 7 DAYS     norethindrone-ethinyl estradiol (MICROGESTIN 1/20) 1-20 MG-MCG tablet Take 1 tablet by mouth daily     Ondansetron (ZOFRAN ODT PO) Take 4 mg by mouth every 8 hours as needed for nausea     oxyCODONE-acetaminophen (PERCOCET) 5-325 MG per tablet Take 1 tablet by mouth every 6 hours as needed for moderate to severe pain     SUMAtriptan (IMITREX) 50 MG tablet 50 mg at the onset of the headache, can repeat every 2 hours for a maximum daily dose of 200 mg   - Adderall XR 20 mg every day     Allergies:   No Known Allergies     Review of Systems:   A comprehensive 10 point review of systems  (constitutional, ENT, cardiac, peripheral vascular, lymphatic, respiratory, GI, , Musculoskeletal, skin, Neurological) was performed and found to be negative except as described in this note.      Physical Exam:   Vitals: /78   Pulse 95   Resp 16   Wt 85.7 kg (189 lb)   SpO2 98%   BMI 32.95 kg/m     General: Seated comfortably in no acute distress.  HEENT: Neck supple with normal range of motion. No paracervical muscle tenderness or tightness.  Optic discs sharp and vasculature normal on funduscopic exam.   Heart: Regular rate  Lungs: breathing comfortably  GI: Non tender  Extremities: no edema  Skin: No rashes  Neurologic:     Mental Status: Fully alert, attentive and oriented. Speech clear and fluent, no paraphasic errors. MiniCog was 3/5 (recalled one word out of 3, clock appropriate). Word production of 20+ words that start with F.  Serial 7's to 2 subtractions correctly, but then multiple errors following. Cube copy appropriate. Recalls 9/11 accurately (place, year, events). Luria to 3 repeats.  Sentence repetition appropriate and without substitutions or errors.     Cranial Nerves: Visual fields intact. PERRL. EOMI with normal smooth pursuit. Facial sensation intact/symmetric. Facial movements symmetric. Hearing not formally tested but intact to conversation. Palate elevation symmetric, uvula midline. No dysarthria. Shoulder shrug strong bilaterally. Tongue protrusion midline.     Motor: No tremors or other abnormal movements observed. Muscle tone normal throughout. No pronator drift. Normal/symmetric rapid finger tapping. Strength 5/5 throughout upper and lower extremities.     Deep Tendon Reflexes: 2+/symmetric throughout upper and lower extremities. No clonus. Toes downgoing bilaterally.     Sensory: Intact/symmetric to light touch, pinprick, temperature, vibration throughout upper and lower extremities. Negative Romberg.      Coordination: Finger-nose-finger intact without dysmetria. Rapid  alternating movements intact/symmetric with normal speed and rhythm.     Gait: Normal, steady casual gait. Able to walk on toes, heels and tandem without difficulty.         Data: Pertinent prior to visit   Imaging:  CT head: 11/27/2020  IMPRESSION:  Unremarkable noncontrast head CT.    CT neck: soft tissue: 11/27/2020  Notable lymph nodes measuring up to 11 mm bilaterally at level IIa (series 2, image 43 and 45), 12 mm left level IIb (series 2, image 40), and 13 mm right level IIb (series 2, image 54). The aforementioned lymph nodes demonstrate elongated morphology.  IMPRESSION:  1. Multiple borderline and mildly enlarged level II lymph nodes are not significantly changed compared to the CT dated 11/27/2020. Though nonspecific, these lymph nodes demonstrate elongated morphology most typical of a reactive etiology.    Laboratory:  - ESR 16, TPO - elevated  - SSA, SSB, Celiac cascade, CRP, DENNISE, HIV, Quant Gold, Lyme, Treponema, Ferritin, LD - normal         Assessment and Plan:   Assessment:  Cognitive impairment   - poor attention, short term recall    The patient has multiple symptoms that are seemingly diffuse and has a relatively normal neurological examination today regarding signs for peripheral neuropathies (small fiber, large fiber, radiculopathy).  She has had reported nromal MRI brain in the past, and her symptoms do not localize to the spinal cord.  It is unclear as to why the patient developed an acute onset of symptoms in 11/2020, but it is notable that she has had elevated TPO stefany (can be incidental), as well as seemingly reactive lymph nodes.  It is possible the patient has some degree of a viral infection leading to reactive inflammatory response and we are seeing the atypical presentations of this condition regarding her symptoms of cognitive dysfunction.  While anxiety/stress certainly are playing a role in her deficits, I am not to certain that these are the etiology of her deficits noted today.   With her reported MRI brain being normal, it is unlikely there is an ongoing demyelinating process, or primary brain tumor at play here.  However, Neuropsychology evaluation would be helpful to see if there is truly a focal cognitive domain that has a deficit versus other explanations (somatization, depression, anxiety), as this would help support the need for alternative investigations (repeat imaging, repeat serum studies with expanded testing relating to vasculitis/paraneoplastic etiologies/endocrine).     Plan:  - Neuropsychology referral   - Obtain MRI brain from outside  -  for ongoing difficulties with finances due to work disability    Follow up in Neurology clinic in 4 weeks (video to discuss imaging which the patient will obtain for my review)  or earlier as needed should new concerns arise.    TONY Horne D.O.   of Neurology    Total time (78 min) in this patient encounter was spent on pre-charting, counseling and/or coordination of care. We reviewed diagnostic results, impressions, and discussed other possible tests if symptoms do not improve. We discussed the implications of the diagnosis, as well as risks and benefits of management options. We reviewed treatment instructions and our scheduled follow-up as specified in the discharge plan. We also discussed the importance of compliance with the chosen course of treatment. The patient is in agreement with this plan and has no further questions.

## 2021-04-27 ENCOUNTER — TELEPHONE (OUTPATIENT)
Dept: CONSULT | Facility: CLINIC | Age: 31
End: 2021-04-27

## 2021-04-27 NOTE — TELEPHONE ENCOUNTER
LVM for patient to call me back directly to schedule GC only visit for family history of osteogenesis imperfecta.

## 2021-05-07 ENCOUNTER — VIRTUAL VISIT (OUTPATIENT)
Dept: CONSULT | Facility: CLINIC | Age: 31
End: 2021-05-07
Attending: GENETIC COUNSELOR, MS
Payer: COMMERCIAL

## 2021-05-07 DIAGNOSIS — Z82.79 FAMILY HISTORY OF OSTEOGENESIS IMPERFECTA: Primary | ICD-10-CM

## 2021-05-07 DIAGNOSIS — R53.82 CHRONIC FATIGUE: ICD-10-CM

## 2021-05-07 DIAGNOSIS — Z71.83 ENCOUNTER FOR NONPROCREATIVE GENETIC COUNSELING: ICD-10-CM

## 2021-05-07 DIAGNOSIS — M35.7 HYPERMOBILITY SYNDROME: ICD-10-CM

## 2021-05-07 PROCEDURE — 96040 HC GENETIC COUNSELING, EACH 30 MINUTES: CPT | Mod: 95 | Performed by: GENETIC COUNSELOR, MS

## 2021-05-21 ENCOUNTER — PATIENT OUTREACH (OUTPATIENT)
Dept: CARE COORDINATION | Facility: CLINIC | Age: 31
End: 2021-05-21

## 2021-05-21 NOTE — PROGRESS NOTES
Social Work Intervention  [facility] [place]    Data/Intervention:    Patient Name:  Rekha Michael  /Age:  1990 (30 year old)    Visit Type: telephone  Referral Source: Dr Horne  Reason for Referral:  finances    Collaborated With:    -Fabby    Patient Concerns/Issues:   We spent about 10 days returning each other's phone calls and were finally able to talk today.  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 She does have LTD available. She is undergoing testing to try to find a reason for her symptoms- neck pain, fatigue, cognitive changes. She's worried about next steps, whether she will be able to return to work and the possible challenges with applying for LTD/SSDI. Her work place has been very supportive of her.    Intervention/Education/Resources Provided:  Reviewed the process for LTD. (basically same as for STD) and often the LTD insurance company provides assistance with applying for SSDI.     Assessment/Plan:  Pt has understanding of the process and is aware she can contact me again as needed. She will address paperwork needs with her PCP.    Provided patient/family with contact information and availability.    MONI Benson, [facility]    [facility]  [phone]

## 2021-06-04 ENCOUNTER — VIRTUAL VISIT (OUTPATIENT)
Dept: NEUROLOGY | Facility: CLINIC | Age: 31
End: 2021-06-04
Payer: COMMERCIAL

## 2021-06-04 DIAGNOSIS — R20.8 ALLODYNIA: Primary | ICD-10-CM

## 2021-06-04 PROBLEM — M35.7 HYPERMOBILITY SYNDROME: Status: ACTIVE | Noted: 2021-06-04

## 2021-06-04 PROBLEM — Z82.79 FAMILY HISTORY OF OSTEOGENESIS IMPERFECTA: Status: ACTIVE | Noted: 2021-03-25

## 2021-06-04 PROBLEM — R41.89 BRAIN FOG: Status: ACTIVE | Noted: 2021-06-04

## 2021-06-04 PROBLEM — R53.82 CHRONIC FATIGUE: Status: ACTIVE | Noted: 2021-06-04

## 2021-06-04 PROBLEM — Z90.79 H/O BILATERAL SALPINGECTOMY: Status: ACTIVE | Noted: 2019-08-26

## 2021-06-04 PROBLEM — L28.1 PRURIGO NODULARIS: Status: ACTIVE | Noted: 2020-12-10

## 2021-06-04 PROBLEM — N92.1 MENOMETRORRHAGIA: Status: ACTIVE | Noted: 2019-08-26

## 2021-06-04 PROBLEM — M54.2 CERVICAL PAIN (NECK): Status: ACTIVE | Noted: 2021-06-04

## 2021-06-04 PROBLEM — M62.838 MUSCLE SPASM: Status: ACTIVE | Noted: 2021-06-04

## 2021-06-04 PROCEDURE — 99214 OFFICE O/P EST MOD 30 MIN: CPT | Mod: 95 | Performed by: PSYCHIATRY & NEUROLOGY

## 2021-06-04 RX ORDER — LEVOTHYROXINE SODIUM 25 UG/1
25 TABLET ORAL
COMMUNITY
Start: 2021-05-28

## 2021-06-04 NOTE — PROGRESS NOTES
Fabby is a 30 year old who is being evaluated via a billable video visit.      How would you like to obtain your AVS? MyChart  If the video visit is dropped, the invitation should be resent by: Send to e-mail at: [email or web address]  Will anyone else be joining your video visit? No      Video Start Time: 424  Video-Visit Details    Type of service:  Video Visit    Video End Time:4:51 PM    Originating Location (pt. Location): Home    Distant Location (provider location):  [facility] NEUROLOGY [facility]     Platform used for Video Visit: [employer]

## 2021-06-04 NOTE — PROGRESS NOTES
[facility] Neurology Follow Up Visit    Rekha Michael MRN# [id number]   Age: 30 year old YOB: 1990     Brief history of symptoms: The patient was initially seen in neurologic consultation on 4/26/2021 for evaluation of cognitive impairment. Please see the comprehensive neurologic consultation note from that date in the Epic records for details. Overall impression was for poor attention and poor short term recall likely from a multi-factorial standpoint.  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   She was recommended to follow with Endocrinology for TPO elevation, obtain outside imaging for my review, and have neuropsychiatric testing done in the future.      Interval history: the patient still feels that her neck pain is improved and hasn't had to use a neck brace in the last week.  She still does feel that water on her back feels different than it should (allodynia).  She did report having an injury to her right wrist, and feels that it is sore.  An Xray was done and she was recommended to wear a wrist brace in the past.       Medications:     Current Outpatient Medications   Medication Sig     acetaminophen (TYLENOL) 325 MG tablet Take 325 mg by mouth     buPROPion (WELLBUTRIN SR) 150 MG 12 hr tablet TAKE 1 TABLET BY MOUTH ONCE DAILY AND INCREASE TO 1 TABLET TWICE DAILY AFTER 4 TO 7 DAYS     cyclobenzaprine (FLEXERIL) 10 MG tablet 10 mg     diazepam (VALIUM) 5 MG tablet 5 mg     DULoxetine (CYMBALTA) 30 MG capsule 30 mg     ketoconazole (NIZORAL) 2 % external shampoo Apply 1 applicator topically     lisdexamfetamine (VYVANSE) 40 MG capsule 40 mg     NALTREXONE HCL PO 1.5 mg     naproxen sodium (ANAPROX) 220 MG tablet Take 220-440 mg by mouth     norethindrone-ethinyl estradiol (MICROGESTIN 1/20) 1-20 MG-MCG tablet Take 1 tablet by mouth daily     Ondansetron (ZOFRAN ODT PO) Take 4 mg by mouth every  8 hours as needed for nausea     ondansetron (ZOFRAN) 8 MG tablet Take 8 mg by mouth     oxyCODONE-acetaminophen (PERCOCET) 5-325 MG per tablet Take 1 tablet by mouth every 6 hours as needed for moderate to severe pain     SUMAtriptan (IMITREX) 50 MG tablet 50 mg at the onset of the headache, can repeat every 2 hours for a maximum daily dose of 200 mg      Physical Exam:   General: Seated comfortably in no acute distress.  HEENT: Neck supple with normal range of motion.   Skin: No rashes  Neurologic:     Mental Status: Fully alert, attentive and oriented. Speech clear and fluent, no paraphasic errors         Assessment and Plan:   Assessment:  Allodynia of the back  Cognitive concerns with attention, focus, memory  Right hand related injury    The patient's MRI was reviewed from 12/2020 and I agree that there there is no definitve white matter changes, hippocampal atrophy, atrophy of the cerebral or cerebellar hemispheres, or hypoxic brain injury noted.  The patient should continue with plans to have neuropsychological testing done.  We discussed that her back related allodynia may be related to some of her medications, or possible small fiber neuropathic related issue (B6 toxicity?).  We also discussed that her right hand related pain wasn't necessarily concerning for median, ulnar, or radial neuropathy and that continued management with ortho is appropriate.     Plan:  Neuropsychological testing  B6 testing in the future  Could consider EMG for right hand/wrist related symptoms if no improvement is noted with wrist brace or absent w/up for EDS/Osteogenisis imperfecta    Follow up in Neurology clinic after neuropsychological testing is done     TONY Horne D.O.   of Neurology      Total time today (25 min) in this patient encounter was spent on pre-charting counseling and/or coordination of care.

## 2021-06-04 NOTE — LETTER
6/4/2021       RE: Rekha Michael  [address]  [place] [address]     Dear Colleague,    Thank you for referring your patient, Rekha Michael, to the [facility] NEUROLOGY CLINIC [facility]. Please see a copy of my visit note below.    [facility] Neurology Follow Up Visit    Rekha Michael MRN# [id number]   Age: 30 year old YOB: 1990     Brief history of symptoms: The patient was initially seen in neurologic consultation on 4/26/2021 for evaluation of cognitive impairment. Please see the comprehensive neurologic consultation note from that date in the Epic records for details. Overall impression was for poor attention and poor short term recall likely from a multi-factorial standpoint.  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   She was recommended to follow with Endocrinology for TPO elevation, obtain outside imaging for my review, and have neuropsychiatric testing done in the future.      Interval history: the patient still feels that her neck pain is improved and hasn't had to use a neck brace in the last week.  She still does feel that water on her back feels different than it should (allodynia).  She did report having an injury to her right wrist, and feels that it is sore.  An Xray was done and she was recommended to wear a wrist brace in the past.       Medications:     Current Outpatient Medications   Medication Sig     acetaminophen (TYLENOL) 325 MG tablet Take 325 mg by mouth     buPROPion (WELLBUTRIN SR) 150 MG 12 hr tablet TAKE 1 TABLET BY MOUTH ONCE DAILY AND INCREASE TO 1 TABLET TWICE DAILY AFTER 4 TO 7 DAYS     cyclobenzaprine (FLEXERIL) 10 MG tablet 10 mg     diazepam (VALIUM) 5 MG tablet 5 mg     DULoxetine (CYMBALTA) 30 MG capsule 30 mg     ketoconazole (NIZORAL) 2 % external shampoo Apply 1 applicator  topically     lisdexamfetamine (VYVANSE) 40 MG capsule 40 mg     NALTREXONE HCL PO 1.5 mg     naproxen sodium (ANAPROX) 220 MG tablet Take 220-440 mg by mouth     norethindrone-ethinyl estradiol (MICROGESTIN 1/20) 1-20 MG-MCG tablet Take 1 tablet by mouth daily     Ondansetron (ZOFRAN ODT PO) Take 4 mg by mouth every 8 hours as needed for nausea     ondansetron (ZOFRAN) 8 MG tablet Take 8 mg by mouth     oxyCODONE-acetaminophen (PERCOCET) 5-325 MG per tablet Take 1 tablet by mouth every 6 hours as needed for moderate to severe pain     SUMAtriptan (IMITREX) 50 MG tablet 50 mg at the onset of the headache, can repeat every 2 hours for a maximum daily dose of 200 mg      Physical Exam:   General: Seated comfortably in no acute distress.  HEENT: Neck supple with normal range of motion.   Skin: No rashes  Neurologic:     Mental Status: Fully alert, attentive and oriented. Speech clear and fluent, no paraphasic errors         Assessment and Plan:   Assessment:  Allodynia of the back  Cognitive concerns with attention, focus, memory  Right hand related injury    The patient's MRI was reviewed from 12/2020 and I agree that there there is no definitve white matter changes, hippocampal atrophy, atrophy of the cerebral or cerebellar hemispheres, or hypoxic brain injury noted.  The patient should continue with plans to have neuropsychological testing done.  We discussed that her back related allodynia may be related to some of her medications, or possible small fiber neuropathic related issue (B6 toxicity?).  We also discussed that her right hand related pain wasn't necessarily concerning for median, ulnar, or radial neuropathy and that continued management with ortho is appropriate.     Plan:  Neuropsychological testing  B6 testing in the future  Could consider EMG for right hand/wrist related symptoms if no improvement is noted with wrist brace or absent w/up for EDS/Osteogenisis imperfecta    Follow up in Neurology clinic  after neuropsychological testing is done     TONY Horne D.O.   of Neurology      Total time today (25 min) in this patient encounter was spent on pre-charting counseling and/or coordination of care.       Fabby is a 30 year old who is being evaluated via a billable video visit.      How would you like to obtain your AVS? MyChart  If the video visit is dropped, the invitation should be resent by: Send to e-mail at: [email or web address]  Will anyone else be joining your video visit? No    Video-Visit Details    Type of service:  Video Visit    Video Start Time: 424    Video End Time:4:51 PM    Originating Location (pt. Location): Home    Distant Location (provider location):  [facility] NEUROLOGY CLINIC [place]     Platform used for Video Visit: [employer]Water

## 2021-06-06 NOTE — PROGRESS NOTES
"GENETIC COUNSELING CONSULTATION NOTE    Date of visit: 05/07/21    Presenting Information:   Rekha \"Fabby\" Fernanda is a 30 year old female referred to the [facility] due to a family history of OI. She was referred by her PCP and was seen for a video genetic counseling appointment today.    Personal History:   Fabby has a family history of OI type 2 in her brother who passed away in 1984 shortly after he was born. Fabby requested a genetic counseling visit to discuss this family history, especially in the context of some of her health concerns and other family member's diagnoses.     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 As a child, Fabby had a wrist fracture and other hairline fractures in her ankles. She had several cavities as a child despite good dental hygiene. As an adult, she has had multiple ankle sprains and sprained her shoulder when she was putting on a t-shirt. She reports some episodes of neck spasms and went to the ED once when she was unable to swallow during one of these spasms. Fabby has endometriosis and had a hysterectomy.     Fabby was seen at an ENT at [facility] after this and was found to have some swollen lymph nodes in her neck. She has seen a number of other specialists including Neurology at [facility], Orthopedics at [facility] orthopedics for her pain who recommended PT, Rheumatology at [facility] for work-up for possible rheumatologic work-up for her pain but labs were burke, and Dermatology at [facility] and [facility] D[facility]. She reports that she sees dermatology for lesions on her scalp and other histiocyte lesions/granuloma. Fabby reports that she has some hypopigmented patches on her body and some are over her more painful joints. She " "also reports a lacy pattern on her legs that is possibly due to blood pooling.     Fabby reports no other concerns for herself today. She has not had an echocardiogram.     Family History: A three generation pedigree was obtained and scanned into the electronic medical record. The relevant portions are described below:      Siblings-     Fabby had a brother who passed away at 1 day old (in 1984) due to OI type 2.     Fabby has a 38 year old brother who is reported to have blue sclerae, poor wound healing, hypermobility, dental problems, and GI issues such as hernias. He also has HIV.     Fabby has a 35 year old sister who is reported to have been given a diagnosis of EDS, no subtype specified yet. She has a history of a seizure disorder that presented in her teens, multiple dislocations, sprains, and fractures of her toes, thin scars, and dental problems such as many cavities despite good dental hygiene and her enamel came off when she had her braces removed as a teen.    She has a 5 year old son who is reported to have blue sclerae, very flexible joints, and sensitive skin.     She has a 6 month old son who is reported to have blue sclerae, sensitive skin-several episodes of hives, and his toes pop out of socket easily.    Parents- Fabby's mother is 64 years old and a history of thin scars, dislocations/sprains in childhood, had heart surgery at age 10 due to a \"sack around her heart,\" three torn tendons in her shoulder and bilateral knee replacement as an adult. Fabby's father is 64 years old and has a history of obesity and a stroke in his 60's.    Maternal Relatives- Fabby has six maternal aunts and two maternal uncles:    One aunt has a history of uterine cancer diagnosed in her 60's which was found upon having a hysterectomy.     She has one son, age 30, who was given a clincial diagnosis of OI due to his history of multiple broken bones. He has not had genetic testing.     She has one daughter who ha antiphospholipid " "syndrome.    She has one son who is healthy.    One aunt has uterine cancer diagnosed in her 50's. She has multiple children who are healthy.    One aunt had a history of antiphospholipid syndrome and passed away in a car accident. She had no children.     Fabby's other aunts and uncles are alive and well. One uncle has one son who has alopecia totalis. One aunt has a daughter who has hypermobility and possibly has EDS. Fabby's other maternal cousins are alive and well.    Fabby's maternal grandmother is 89 years old and has a history of osteoarthritis, squamous cell carcinoma, kidney cancer in her 60-70's, a cavernous hemangioma, and a TIA. Her mother  at age 96 and is reported to have had \"small strokes.\"     Fabby's maternal grandfather had a history of glaucoma, multiple skin cancers, and osteoporosis. He  at age 87 due to dementia.    Paternal Relatives- Fabby has two paternal uncles who are alive and well. She has one paternal aunt who  at age 47 due to metastatic uterine cancer. Fabby's paternal cousins are reported to be alive and well, but there is limited information about their health. Fabby's paternal grandmother had a history of thrombocytosis, a stroke at age 60 and  at age 61 due to CML. Fabby's paternal grandfather  at age 66 due to congestive heart failure and mesothelioma.     Family history is otherwise largely non-contributory. Maternal ancestry is  and paternal ancestry is Kosovan and other Europan. Consanguinity was denied.     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 This genetic counseling appointment was scheduled to discussed the family history of OI. However, given her family history and personal history, I think an evaluation with a  is warranted for Fabby and possibly some genetic testing for OI.    The majority of cases " "of osteogenesis imperfecta (OI) are caused by variants in COL1A1 and COL1A2. OI caused by variants in these genes are categorized into four different subtypes based on age of onset and severity:    Type 1/ Classic non-deforming OI: Mild form of OI that causes few to many fractures, blue sclerae, and hearing loss (reorted in 50% of cases). About 60% of cases are de merlin    Type 2/  lethal OI: Most severe form of OI. About 60% of individuals pass away at day 1 of life, 80% pass away by 1 week of life, and survival beyond 1 year is rare. OI type 2 causes severe bone deformity, minimal calvarial mineralization, platyspondyly, compression of long bones, severely short stature, and dark blue sclerae. Virtually all of these cases are de merlin or due to germline mosaicism.    Type 3/ Progressively deforming OI: A severe form of OI that causes moderate to severe bone deformity such as thin ribs, platyspondyly, thin gracile bones with many fractures, \"popcorn\" epiphyses, very short stature, frequent hearing loss (onset usually in teens), dentinogenesis imperfecta, and blue sclerae. Most individuals with OI type 3 do not walk without assistance.     Type 4/ Common variant OI: a mild to moderate form of OI that causes multiple fractures, variably short stature, and sometimes hearing loss (adult onset). Sclerae is usually normal/white.     These forms of OI are inherited with autosomal dominant inheritance which means a single variant in the COL1A1 or COL1A2 genes is sufficient to cause the condition. As previously mentioned, many cases are de merlin, meaning a child has the COL1A1 or COL1A2 as the result of a new variant that is not inherited from a parent. However, parental somatic and/or germline mosaicism is present in up to 16% of families.     There are several genotype-phenotype correlations with COL1A1 and COL1A2 variants and how they impact type 1 collagen function and therefore the severity of OI. Fabby's brother " passed away from type 2 OI and he did not have any genetic testing. However, it is unlikely that Fabby would have the same COL1A1 and COL1A2 variant, as the variants that cause type 2 OI are severe, but we cannot rule out the possibility of mosaicism. It is interesting that Fabby and her other living siblings have some OI features and she has a 30 year old cousin with a clinical diagnosis of OI. It is possible that Fabby's brother could have had a different  lethal condition with a similar presentation such as  hypophosphatasia, thanatophoric dysplasia, achondrogenesis type 1B, or campomelic dysplasia. This cannot be clarified any further, and even if her brother had type 2 OI it is possible that Fabby and other family members may have a different type of OI due to a different gene variant or a different connective tissue disorder explaining their features.     Nikhil-Danlos syndrome (EDS) is a connective tissue disorder with 13 different subtypes. There are three main subtypes, Classical EDS (cEDS), Vascular EDS (vEDS), and Hypermobile EDS (hEDS). The main features of cEDS include generalized joint hypermobility, dislocations/subluxations, hernia, easy bruising, and skin that is hyperextensible, soft and fragile with atrophic scarring. cEDS is an autosomal dominant condition typically caused by mutations in the COL5A1 and COL5A2 genes and rarely COL1A1. vEDS is a rarer and more severe type of EDS. Individuals with vEDS are at risk for arterial rupture, colon perforation, and other organ rupture such as the uterus during pregnancy. Other features of vEDS include thin, translucent skin, small joint hypermobility, tendon or muscle rupture, acrogeria, spontaneous pneumothorax, easy bruising, and others. vEDS is an autosomal dominant condition caused by mutations in the COL3A1 gene. hEDS is the most common type of EDS and is characterized by generalized joint hypermobility, GI issues, dislocations, skin  findings similar to those found in cEDS but with a much milder presentation, chronic pain, and others. hEDS is thought to be an autosomal dominant condition predominantly affecting females, but the genetic cause of hEDS remains unknown and therefore diagnosis of hEDS relies on meeting clinical diagnostic criteria. Updated diagnostic criteria was published by the International EDS Consortium in 2017 and incorporates the Beighton score for generalized joint hypermobility assessment, family history, chronic pain, presence of other minor features of connective tissue disorders, and ruling out other connective tissue disorders (DONATO Rincon. et al. 2017.The 2017 international classification of the Nikhil-Danlos syndromes. Am J Med Mary Part C Semin Med Mary 175C: 8- 26).    Less than 1% of classic EDS is attributed to a specific missense COL1A1 variant. Other Specific missense and splice site variants in COL1A1 and COL1A2 have been associated with other subtypes of EDS: cardiac valvular EDS which is an autosomal recessive subtype that causes skin hyperextensibility, atrophic scarring, easy bruising, joint hypermobility, and severe progressive cardiac valvular problems, and arthrochalasia EDS which causes congenital hip dislocations, extreme joint laxity with recurrent joint subluxations and minimal skin involvement.    Given Fabby's family history and personal history, I think further evaluation is warranted for Fabby and possibly some genetic testing for OI. An evaluation with a  for further evaluation of her clinical features would aid in guiding the best genetic testing for Fabby.    It was a pleasure meeting Fabby today. I will review her personal and family history with our geneticists and follow-up with Fabby on their recommendations. She was encouraged to reach out to me if she has any further questions.     Plan:  1. I will speak with my colleagues in Genetics to see if an evaluation with a  is  recommended for Fabby or one of her relatives.      Jeanette Calero MS, [facility]  Licensed Genetic Counselor   [facility]  Phone: [phone]  Fax: [phone]    Time spent in consultation face to face was approximately 60 minutes.

## 2021-06-07 ENCOUNTER — TELEPHONE (OUTPATIENT)
Dept: NEUROLOGY | Facility: CLINIC | Age: 31
End: 2021-06-07

## 2021-06-07 NOTE — TELEPHONE ENCOUNTER
I have not heard back from the pt.  I will send her a my chart message asking her to call and schedule her neuropsych testing    ----- Message from Carlos Nunes sent at 6/7/2021  9:13 AM CDT -----  Regarding: RE: Neuropsychiatric referral  Did she call you? We left her a messages on May 3rd, 15th & 28th.  ----- Message -----  From: Hortencia Butler RN  Sent: 6/7/2021   5:09 AM CDT  To: Clinic [id number]  Subject: FW: Neuropsychiatric referral                    Please help with neuropsych referral per Dr Horne.    Thank you!  ----- Message -----  From: Kuldeep Horne DO  Sent: 6/4/2021   4:25 PM CDT  To: Naty Hinojosa, RN, Hortencia Butler RN  Subject: Neuropsychiatric referral                        Could you help this patient set up a neuropsychiatric appointment?    Thanks,  ASEAN

## 2021-06-08 ENCOUNTER — TELEPHONE (OUTPATIENT)
Dept: CONSULT | Facility: CLINIC | Age: 31
End: 2021-06-08

## 2021-06-08 NOTE — TELEPHONE ENCOUNTER
LVM for patient to call back to schedule new patient Genetics appointment with Dr. Hosea Petersen for family history of osteogenesis imperfecta (previously seen as GC only by Jeanette Calero). When patient calls back, please assist in scheduling appointment with GC visit 30 minutes prior. In person preferred.

## 2021-06-10 NOTE — TELEPHONE ENCOUNTER
See note below. 2nd message left for patient to call back to schedule Genetics appt with Dr. Petersen.

## 2021-08-02 ENCOUNTER — OFFICE VISIT (OUTPATIENT)
Dept: CONSULT | Facility: CLINIC | Age: 31
End: 2021-08-02
Attending: STUDENT IN AN ORGANIZED HEALTH CARE EDUCATION/TRAINING PROGRAM
Payer: COMMERCIAL

## 2021-08-02 VITALS
WEIGHT: 185.41 LBS | BODY MASS INDEX: 31.65 KG/M2 | HEIGHT: 64 IN | SYSTOLIC BLOOD PRESSURE: 135 MMHG | HEART RATE: 91 BPM | DIASTOLIC BLOOD PRESSURE: 84 MMHG

## 2021-08-02 DIAGNOSIS — Z82.79 FAMILY HISTORY OF OSTEOGENESIS IMPERFECTA: Primary | ICD-10-CM

## 2021-08-02 DIAGNOSIS — M35.7 HYPERMOBILITY SYNDROME: ICD-10-CM

## 2021-08-02 DIAGNOSIS — Z71.83 ENCOUNTER FOR NONPROCREATIVE GENETIC COUNSELING: ICD-10-CM

## 2021-08-02 DIAGNOSIS — R53.82 CHRONIC FATIGUE: ICD-10-CM

## 2021-08-02 DIAGNOSIS — T07.XXXA MULTIPLE FRACTURES: Primary | ICD-10-CM

## 2021-08-02 PROCEDURE — G0463 HOSPITAL OUTPT CLINIC VISIT: HCPCS

## 2021-08-02 PROCEDURE — 99204 OFFICE O/P NEW MOD 45 MIN: CPT | Performed by: STUDENT IN AN ORGANIZED HEALTH CARE EDUCATION/TRAINING PROGRAM

## 2021-08-02 PROCEDURE — 36415 COLL VENOUS BLD VENIPUNCTURE: CPT | Performed by: GENETIC COUNSELOR, MS

## 2021-08-02 PROCEDURE — 999N000069 HC STATISTIC GENETIC COUNSELING, < 16 MIN: Performed by: GENETIC COUNSELOR, MS

## 2021-08-02 RX ORDER — LISDEXAMFETAMINE DIMESYLATE 60 MG/1
60 CAPSULE ORAL EVERY MORNING
COMMUNITY

## 2021-08-02 ASSESSMENT — MIFFLIN-ST. JEOR: SCORE: 1536.87

## 2021-08-02 NOTE — PROGRESS NOTES
GENETICS CLINIC CONSULTATION     Name:  Rekha Michael  :   1990  MRN:   [id number]  Date of service: Aug 2, 2021  Primary Care Provider: Mat Nevarez  Referring Provider: Giovanna Arceo    Dear Dr. Arceo     We had the pleasure of seeing Rekha Michael for Genetics Clinic today. She also saw our genetic counselor at this visit.    Reason for consultation, Summary statement:  A consultation in the [facility] was requested for Rekha, a 30 year old female, for evaluation of hypermobility and workup for possible osteogenesis imperfecta (OI). She has a long history of multiple dislocations, hairline fractures, dental cavities, and muscle and joint pain as well as multiple family members on her maternal side with similar symptoms. She has recently experienced worsening of many of her symptoms as well as new neurologic symptoms, which is what prompted her to seek care with us. Given the question of possible osteogenesis imperfecta we did recommend testing for her today.   -----------------------------------  Assessment and Plan:     Assessment:    Rekha Michael is a 30 year old female who presents for workup of hypermobility and possible OI. Her family history with multiple family members with fractures, dental problems, and blue sclerae is suggestive of OI. Additionally, she has a brother who passed away at one day old from clinically diagnosed Type II OI and a cousin who has also received a clinical diagnosis of OI; none of her family members have undergone formal genetic testing. Given this history, and Fabby's personal history of dental problems, dislocations, and hairline fractures, we recommend genetic testing for OI. Formal genetic testing can be helpful in providing individuals and family members with an explanation for their symptoms, and having a clear diagnosis can help patients like Fabby better receive the care that they need.    While Fabby does have many  "symptoms related to hypermobility, she does not meet formal diagnostic criteria for hypermobile EDS. Hypermobility syndrome and hypermobile EDS exist on a spectrum, and regardless of the formal diagnosis, management involves addressing individual symptoms. Given Fabby's clinical picture of multiple overlapping symptoms that may be related to hypermobility, OI, or another connective tissue disorder, it will be useful to perform genetic testing for additional connective tissue disorders in order to better elucidate whether there is an underlying genetic component causing her symptoms. An alternative genetic diagnosis would allow exclusion of the hypermobile EDs which is currently not amenable to molecular testing.     Plan:      1. Genetic testing with OI and connective tissue disease gene panels.  2. Genetic counseling consultation with Jeanette Calero, MS, [facility] to update pedigree and obtain consent for genetic testing  Follow up: Follow-up pending results of genetic testing.      -----------------------------------    History of Present Illness:  History was obtained from Patient and electronic health record.    Rekha Michael is a 30 year old female with fatigue, joint and muscle pain, and new onset neurologic symptoms as well as a history of multiple hairline fractures, joint dislocations, and dental problems. She reports that as a child she dislocated her left shoulder multiple times without significant trauma, and a couple of years ago she had an AC sprain while putting on a t-shirt. As a child she also had multiple hairline fractures that occurred without significant trauma. She reports a history of blue sclerae, although this has decreased with age. She has also had a large number of cavities and cracks and chips in her teeth. Fabby also notes that she has always been hyperextensible. Several members of her family have similar symptoms and so she did not realize that many of these things were \"abnormal.\" Her " "sister broke seven bones in her foot after stubbing her toe, and both her brother and sister had extensive dental damage when their braces were removed. Notably, she had a brother who passed away at one day old due to what was diagnosed clinically as OI Type II. She also has a cousin who was given a clinical diagnosis of OI (subtype unspecified) based on an extensive history of fractures. He \"broke his hip to his kneecap\" at 16 months old and was put in a full body cast, and at the time of his last ortho visit for this injury he broke his ulna and radius as well. He is deaf in his left ear. Additional family history is documented in the genetic counseling note.    Over the past year Fabby has been struggling with worsening joint pain (especially in her R wrist and neck), fatigue, and new onset neurologic symptoms which is what prompted her to seek care at this time. Her joint pain is primarily in her right wrist and neck. She describes severe fatigue and \"apathy\" to the point that it is difficult to get out of bed in the morning. In November 2020 she started to experience multiple neurologic symptoms, including muscle twitching in her right knee and quad, executive function issues, and headaches that are different than her usual migraines. She also reports a single instance where she was hallucinating and it looked like the carpet was moving. She has been seen by neurology and had extensive imaging done. Her symptoms have significantly impacted her life, and she was on leave from work for 6 months (January-July 2021).    In February 2021 she was seen in the ED for an episode where she was unable to swallow. This resolved, but she reports ongoing milder symptoms such as choking on saliva and feeling like it is difficult to swallow. She has also had skin reactions and notes areas of skin hypo- and hyper-pigmentation over joints that are painful. She was seen by two rheumatologists, both of whom performed lab testing and " "determined that her symptoms are not due to a rheumatologic condition. She has seen cardiology for work-up of possible POTS. She had an EKG that showed a \"T wave abnormality\" and wore a ZioPatch for a week; she has not received the results of this yet. She is scheduled for an echo at the end of this month.    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      Past Medical History:  Past Medical History:   Diagnosis Date     Endometriosis      Migraine        Past Surgical History:  Past Surgical History:   Procedure Laterality Date     LAPAROSCOPIC CYSTECTOMY OVARIAN (BENIGN) Bilateral 9/12/2016    Procedure: LAPAROSCOPIC CYSTECTOMY OVARIAN (BENIGN);  Surgeon: Federico Maria MD;  Location: [facility]     LAPAROSCOPY DIAGNOSTIC (GENERAL)       MOUTH SURGERY     hysterectomy      Medications:  Current Outpatient Medications   Medication Sig Dispense Refill     acetaminophen (TYLENOL) 325 MG tablet Take 325 mg by mouth       cyclobenzaprine (FLEXERIL) 10 MG tablet 10 mg       DULoxetine (CYMBALTA) 30 MG capsule 30 mg       ketoconazole (NIZORAL) 2 % external shampoo Apply 1 applicator topically       levothyroxine (SYNTHROID/LEVOTHROID) 25 MCG tablet Take 25 mcg by mouth       lisdexamfetamine (VYVANSE) 60 MG capsule Take 60 mg by mouth every morning       NALTREXONE HCL PO 1.5 mg       " naproxen sodium (ANAPROX) 220 MG tablet Take 220-440 mg by mouth       Ondansetron (ZOFRAN ODT PO) Take 4 mg by mouth every 8 hours as needed for nausea       ondansetron (ZOFRAN) 8 MG tablet Take 8 mg by mouth       SUMAtriptan (IMITREX) 50 MG tablet 50 mg at the onset of the headache, can repeat every 2 hours for a maximum daily dose of 200 mg 7 tablet 5     buPROPion (WELLBUTRIN SR) 150 MG 12 hr tablet TAKE 1 TABLET BY MOUTH ONCE DAILY AND INCREASE TO 1 TABLET TWICE DAILY AFTER 4 TO 7 DAYS 60 tablet 2     diazepam (VALIUM) 5 MG tablet 5 mg       lisdexamfetamine (VYVANSE) 40 MG capsule 40 mg       norethindrone-ethinyl estradiol (MICROGESTIN 1/20) 1-20 MG-MCG tablet Take 1 tablet by mouth daily 84 tablet 3     oxyCODONE-acetaminophen (PERCOCET) 5-325 MG per tablet Take 1 tablet by mouth every 6 hours as needed for moderate to severe pain         Allergies:  Allergies   Allergen Reactions     Lac Bovis Other (See Comments)     Bowel movement pain and gut pain.       Immunization:  Most Recent Immunizations   Administered Date(s) Administered     COVID-19,PF,Pfizer 04/21/2021       Diet:  Regular    Care team:  Patient Care Team:  Mat Nevarez NP as PCP - General  Giovanna Arceo MD as Assigned PCP  Kuldeep Horne DO as MD (Neurology)  Kuldeep Horne DO as Assigned Neuroscience Provider  Hosea Petersen Jr., MD as MD (Genetics, Clinical)    Review of Symptoms:   Constitutional: Negative for fevers.  Neurologic: Negative for seizures. Positive for migraine headaches, numbness of the R hand, lack of proprioception, and presyncopal episodes.  Eyes: Wears glasses. Negative for vision problems.  Ears: Positive for tinnitus. Negative for hearing loss.  Nose/Throat: Negative for nosebleeds and rhinorrhea. Positive for rhinorrhea. Cavities and broken teeth. Highly sensitive to smells.   Respiratory: Negative for asthma, dyspnea, pneumothorax.  Cardiovascular: Negative for chest pain and  "syncope. Positive for presyncope.  Gastrointestinal: Positive for abdominal pain, dyschezia - associated with endometriosis, and have improved thought not resolved following surgery.  Renal: Negative for recurrent UTIs and kidney disease.  Musculoskeletal: Positive for pain in her R wrist, neck, and bottoms of feet.  Skin: Positive for hyperpigmentation on her hands and scalp lesions.  Psychiatric: Positive for history of MDD, SUKH, and polysubstance abuse with benzos as a teen. Currently only positive for MDD. Often has anhedonia, rarely feels down/depressed/hopeless.  Hematologic/Lymphatic: Positive for easy bruising. Negative for prolonged bleeding.  Endocrine: Euthyroid on treatment.   Sleep: Positive for difficulty sleeping, endorses poor sleep hygiene.  : endometriosis. Now s/p hysterectomy    Family History:    A detailed pedigree was obtained by the genetic counselor at the time of this appointment and is scanned into the electronic medical record. I personally reviewed and discussed the pedigree with the GC and the family and concur with the GC note. Please refer to the formal pedigree for full details.     Additional family history gathered today:  - She has a maternal cousin who \"broke his hip to his kneecap\" at 16 months old and was in a full body cast. On the day of his final ortho appointment for that injury, he broke his ulna and radius. He had multiple additional fractures throughout his childhood. He is also deaf in his left ear.    Social History:  Works as a nurse  Lives with   Community resources received currently are unknown.    Physical Examination:  Blood pressure 135/84, pulse 91, height 5' 4.06\" (162.7 cm), weight 185 lb 6.5 oz (84.1 kg), not currently breastfeeding.  Weight %tile:Facility age limit for growth percentiles is 20 years.  Height %tile: Facility age limit for growth percentiles is 20 years.  Head Circumference %tile: Facility age limit for growth percentiles is 20 " years.  BMI %tile: Facility age limit for growth percentiles is 20 years.    Pictures taken during the visit: no    General: well developed, well nourished in no acute distress, appears stated age,   Head/Skull: Normocephalic by shape and size. Atraumatic. Face: nondysmorphic.  Ears: Well-formed, normal in position and placement, canals patent  Eyes: Normal in position and placement, EOMI; lids, lashes, and brows unremarkable  Nose: Nares patent  Mouth/Throat: Lips, philtrum unremarkable. Mildly elevated palate. Multiple dental repairs.  Chest: Symmetric. No pectus.  Respiratory: Clear to auscultation bilaterally. Nonlabored on room air.   Cardiovascular: Regular rate and rhythm with no murmur.   Abdomen: Nondistended, soft, nontender, no hepatosplenomegaly  Extremities/Musculoskeletal: Symmetrical; full ROM; hands, feet, nails, palmar and plantar creases unremarkable> normal digitation.  Thumb abducts to touch wrist, pinky extends beyond ninety degrees. Hyperextension of R elbow to 10 degrees, L elbow to 8 degrees. Able to palm floor. Knees not hyperexstensible. Arm span 158.5 cm. Extremities appear proportionate, piezogenic papules noted on bilateral heels measurement of lower extremity:height ratio deferred.  Neurologic: Mental status appropriate for age.  Skin: Stretch marks on lateral abdomen bilaterally. No rashes on exposed skin.    Genetic testing done to date:  None.    Pertinent lab results:   None.    Imaging/ procedure results:  - upper extremity x-rays done in May, not available in chart.  ------------------------------------------------------------------  Closing:     Thank you for allowing us to participate in the care of Rekha Michael. Please do not hesitate to contact us with questions.    Patient was seen and discussed with Dr. Petersen.  - Sona Bennett, MS4    I spent a total of 45 minutes face-to-face with this patient during today's office visit. In addition also spent 10 minutes in  chart and literature review and in documentation for total of 55 minutes. See note for details  55 min spent on the date of the encounter in chart review, patient visit, review of tests, documentation and/or discussion with other providers about the issues documented above. In addition     Hosea Petersen, [facility]  Division of Genetics and Metabolism,   Department of Pediatrics  [email or web address]        Route to  Patient Care Team:  Mat Nevarez NP as PCP - Giovanna Chase MD as Assigned PCP  Kuldeep Horne DO as MD (Neurology)  Kuldeep Horne DO as Assigned Neuroscience Provider  Hosea Petersen Jr., MD as MD (Genetics, Clinical)

## 2021-08-02 NOTE — LETTER
2021      RE: Rekha Michael  [address]  [facility] [address]       GENETICS CLINIC CONSULTATION     Name:  Rekha Michael  :   1990  MRN:   [id number]  Date of service: Aug 2, 2021  Primary Care Provider: Mat Nevarez  Referring Provider: Giovanna Arceo    Dear Dr. Arceo     We had the pleasure of seeing Rekha Michael for Genetics Clinic today. She also saw our genetic counselor at this visit.    Reason for consultation, Summary statement:  A consultation in the [facility] was requested for Rekha, a 30 year old female, for evaluation of hypermobility and workup for possible osteogenesis imperfecta (OI). She has a long history of multiple dislocations, hairline fractures, dental cavities, and muscle and joint pain as well as multiple family members on her maternal side with similar symptoms. She has recently experienced worsening of many of her symptoms as well as new neurologic symptoms, which is what prompted her to seek care with us. Given the question of possible osteogenesis imperfecta we did recommend testing for her today.   -----------------------------------  Assessment and Plan:     Assessment:    Rekha Michael is a 30 year old female who presents for workup of hypermobility and possible OI. Her family history with multiple family members with fractures, dental problems, and blue sclerae is suggestive of OI. Additionally, she has a brother who passed away at one day old from clinically diagnosed Type II OI and a cousin who has also received a clinical diagnosis of OI; none of her family members have undergone formal genetic testing. Given this history, and Fabby's personal history of dental problems, dislocations, and hairline fractures, we recommend genetic testing for OI. Formal genetic testing can be helpful in providing individuals and family members with an explanation for their symptoms, and having a clear diagnosis can help  patients like Fabby better receive the care that they need.    While Fabby does have many symptoms related to hypermobility, she does not meet formal diagnostic criteria for hypermobile EDS. Hypermobility syndrome and hypermobile EDS exist on a spectrum, and regardless of the formal diagnosis, management involves addressing individual symptoms. Given Fabby's clinical picture of multiple overlapping symptoms that may be related to hypermobility, OI, or another connective tissue disorder, it will be useful to perform genetic testing for additional connective tissue disorders in order to better elucidate whether there is an underlying genetic component causing her symptoms. An alternative genetic diagnosis would allow exclusion of the hypermobile EDs which is currently not amenable to molecular testing.     Plan:      1. Genetic testing with OI and connective tissue disease gene panels.  2. Genetic counseling consultation with Jeanette Calero, MS, [facility] to update pedigree and obtain consent for genetic testing  Follow up: Follow-up pending results of genetic testing.      -----------------------------------    History of Present Illness:  History was obtained from Patient and electronic health record.    Rekha Michael is a 30 year old female with fatigue, joint and muscle pain, and new onset neurologic symptoms as well as a history of multiple hairline fractures, joint dislocations, and dental problems. She reports that as a child she dislocated her left shoulder multiple times without significant trauma, and a couple of years ago she had an AC sprain while putting on a t-shirt. As a child she also had multiple hairline fractures that occurred without significant trauma. She reports a history of blue sclerae, although this has decreased with age. She has also had a large number of cavities and cracks and chips in her teeth. Fabby also notes that she has always been hyperextensible. Several members of her family have similar  "symptoms and so she did not realize that many of these things were \"abnormal.\" Her sister broke seven bones in her foot after stubbing her toe, and both her brother and sister had extensive dental damage when their braces were removed. Notably, she had a brother who passed away at one day old due to what was diagnosed clinically as OI Type II. She also has a cousin who was given a clinical diagnosis of OI (subtype unspecified) based on an extensive history of fractures. He \"broke his hip to his kneecap\" at 16 months old and was put in a full body cast, and at the time of his last ortho visit for this injury he broke his ulna and radius as well. He is deaf in his left ear. Additional family history is documented in the genetic counseling note.    Over the past year Fabby has been struggling with worsening joint pain (especially in her R wrist and neck), fatigue, and new onset neurologic symptoms which is what prompted her to seek care at this time. Her joint pain is primarily in her right wrist and neck. She describes severe fatigue and \"apathy\" to the point that it is difficult to get out of bed in the morning. In November 2020 she started to experience multiple neurologic symptoms, including muscle twitching in her right knee and quad, executive function issues, and headaches that are different than her usual migraines. She also reports a single instance where she was hallucinating and it looked like the carpet was moving. She has been seen by neurology and had extensive imaging done. Her symptoms have significantly impacted her life, and she was on leave from work for 6 months (January-July 2021).    In February 2021 she was seen in the ED for an episode where she was unable to swallow. This resolved, but she reports ongoing milder symptoms such as choking on saliva and feeling like it is difficult to swallow. She has also had skin reactions and notes areas of skin hypo- and hyper-pigmentation over joints that are " "painful. She was seen by two rheumatologists, both of whom performed lab testing and determined that her symptoms are not due to a rheumatologic condition. She has seen cardiology for work-up of possible POTS. She had an EKG that showed a \"T wave abnormality\" and wore a ZioPatch for a week; she has not received the results of this yet. She is scheduled for an echo at the end of this month.    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      Past Medical History:  Past Medical History:   Diagnosis Date     Endometriosis      Migraine        Past Surgical History:  Past Surgical History:   Procedure Laterality Date     LAPAROSCOPIC CYSTECTOMY OVARIAN (BENIGN) Bilateral 9/12/2016    Procedure: LAPAROSCOPIC CYSTECTOMY OVARIAN (BENIGN);  Surgeon: Federico Maria MD;  Location: [facility]     LAPAROSCOPY DIAGNOSTIC (GENERAL)       MOUTH SURGERY     hysterectomy      Medications:  Current Outpatient Medications   Medication Sig Dispense Refill     acetaminophen (TYLENOL) 325 MG tablet Take 325 mg by mouth       cyclobenzaprine (FLEXERIL) 10 MG tablet 10 mg       DULoxetine (CYMBALTA) 30 MG capsule 30 mg       ketoconazole (NIZORAL) 2 % external shampoo Apply 1 applicator topically       levothyroxine (SYNTHROID/LEVOTHROID) 25 MCG tablet Take 25 mcg by mouth       lisdexamfetamine (VYVANSE) 60 MG " capsule Take 60 mg by mouth every morning       NALTREXONE HCL PO 1.5 mg       naproxen sodium (ANAPROX) 220 MG tablet Take 220-440 mg by mouth       Ondansetron (ZOFRAN ODT PO) Take 4 mg by mouth every 8 hours as needed for nausea       ondansetron (ZOFRAN) 8 MG tablet Take 8 mg by mouth       SUMAtriptan (IMITREX) 50 MG tablet 50 mg at the onset of the headache, can repeat every 2 hours for a maximum daily dose of 200 mg 7 tablet 5     buPROPion (WELLBUTRIN SR) 150 MG 12 hr tablet TAKE 1 TABLET BY MOUTH ONCE DAILY AND INCREASE TO 1 TABLET TWICE DAILY AFTER 4 TO 7 DAYS 60 tablet 2     diazepam (VALIUM) 5 MG tablet 5 mg       lisdexamfetamine (VYVANSE) 40 MG capsule 40 mg       norethindrone-ethinyl estradiol (MICROGESTIN 1/20) 1-20 MG-MCG tablet Take 1 tablet by mouth daily 84 tablet 3     oxyCODONE-acetaminophen (PERCOCET) 5-325 MG per tablet Take 1 tablet by mouth every 6 hours as needed for moderate to severe pain         Allergies:  Allergies   Allergen Reactions     Lac Bovis Other (See Comments)     Bowel movement pain and gut pain.       Immunization:  Most Recent Immunizations   Administered Date(s) Administered     COVID-19,PF,Pfizer 04/21/2021       Diet:  Regular    Care team:  Patient Care Team:  Mat Nevarez NP as PCP - General  Giovanna Arceo MD as Assigned PCP  Kuldeep Horne DO as MD (Neurology)  Kuldeep Horne DO as Assigned Neuroscience Provider  Hosea Petersen Jr., MD as MD (Genetics, Clinical)    Review of Symptoms:   Constitutional: Negative for fevers.  Neurologic: Negative for seizures. Positive for migraine headaches, numbness of the R hand, lack of proprioception, and presyncopal episodes.  Eyes: Wears glasses. Negative for vision problems.  Ears: Positive for tinnitus. Negative for hearing loss.  Nose/Throat: Negative for nosebleeds and rhinorrhea. Positive for rhinorrhea. Cavities and broken teeth. Highly sensitive to smells.   Respiratory: Negative  "for asthma, dyspnea, pneumothorax.  Cardiovascular: Negative for chest pain and syncope. Positive for presyncope.  Gastrointestinal: Positive for abdominal pain, dyschezia - associated with endometriosis, and have improved thought not resolved following surgery.  Renal: Negative for recurrent UTIs and kidney disease.  Musculoskeletal: Positive for pain in her R wrist, neck, and bottoms of feet.  Skin: Positive for hyperpigmentation on her hands and scalp lesions.  Psychiatric: Positive for history of MDD, SUKH, and polysubstance abuse with benzos as a teen. Currently only positive for MDD. Often has anhedonia, rarely feels down/depressed/hopeless.  Hematologic/Lymphatic: Positive for easy bruising. Negative for prolonged bleeding.  Endocrine: Euthyroid on treatment.   Sleep: Positive for difficulty sleeping, endorses poor sleep hygiene.  : endometriosis. Now s/p hysterectomy    Family History:    A detailed pedigree was obtained by the genetic counselor at the time of this appointment and is scanned into the electronic medical record. I personally reviewed and discussed the pedigree with the GC and the family and concur with the GC note. Please refer to the formal pedigree for full details.     Additional family history gathered today:  - She has a maternal cousin who \"broke his hip to his kneecap\" at 16 months old and was in a full body cast. On the day of his final ortho appointment for that injury, he broke his ulna and radius. He had multiple additional fractures throughout his childhood. He is also deaf in his left ear.    Social History:  Works as a nurse  Lives with   Community resources received currently are unknown.    Physical Examination:  Blood pressure 135/84, pulse 91, height 5' 4.06\" (162.7 cm), weight 185 lb 6.5 oz (84.1 kg), not currently breastfeeding.  Weight %tile:Facility age limit for growth percentiles is 20 years.  Height %tile: Facility age limit for growth percentiles is 20 " years.  Head Circumference %tile: Facility age limit for growth percentiles is 20 years.  BMI %tile: Facility age limit for growth percentiles is 20 years.    Pictures taken during the visit: no    General: well developed, well nourished in no acute distress, appears stated age,   Head/Skull: Normocephalic by shape and size. Atraumatic. Face: nondysmorphic.  Ears: Well-formed, normal in position and placement, canals patent  Eyes: Normal in position and placement, EOMI; lids, lashes, and brows unremarkable  Nose: Nares patent  Mouth/Throat: Lips, philtrum unremarkable. Mildly elevated palate. Multiple dental repairs.  Chest: Symmetric. No pectus.  Respiratory: Clear to auscultation bilaterally. Nonlabored on room air.   Cardiovascular: Regular rate and rhythm with no murmur.   Abdomen: Nondistended, soft, nontender, no hepatosplenomegaly  Extremities/Musculoskeletal: Symmetrical; full ROM; hands, feet, nails, palmar and plantar creases unremarkable> normal digitation.  Thumb abducts to touch wrist, pinky extends beyond ninety degrees. Hyperextension of R elbow to 10 degrees, L elbow to 8 degrees. Able to palm floor. Knees not hyperexstensible. Arm span 158.5 cm. Extremities appear proportionate, piezogenic papules noted on bilateral heels measurement of lower extremity:height ratio deferred.  Neurologic: Mental status appropriate for age.  Skin: Stretch marks on lateral abdomen bilaterally. No rashes on exposed skin.    Genetic testing done to date:  None.    Pertinent lab results:   None.    Imaging/ procedure results:  - upper extremity x-rays done in May, not available in chart.  ------------------------------------------------------------------  Closing:     Thank you for allowing us to participate in the care of Rekha Michael. Please do not hesitate to contact us with questions.    Patient was seen and discussed with Dr. Petersen.  - Sona Bennett, MS4    I spent a total of 45 minutes face-to-face with  this patient during today's office visit. In addition also spent 10 minutes in chart and literature review and in documentation for total of 55 minutes. See note for details  55 min spent on the date of the encounter in chart review, patient visit, review of tests, documentation and/or discussion with other providers about the issues documented above. In addition     Hosea Petersen, [facility]  Division of Genetics and Metabolism,   Department of Pediatrics  [email or web address]        Route to  Patient Care Team:  Mat Nevarez NP as PCP - General  Giovanna Arceo MD as Assigned PCP  Kuldeep Horne DO as MD (Neurology)

## 2021-08-02 NOTE — NURSING NOTE
"Chief Complaint   Patient presents with     Consult     Has met with Jeanette 'here to see if there is any genetic testing to be moving forward with'       /84 (BP Location: Right arm, Patient Position: Sitting, Cuff Size: Adult Regular)   Pulse 91   Ht 5' 4.06\" (162.7 cm)   Wt 185 lb 6.5 oz (84.1 kg)   BMI 31.77 kg/m      Windy Fierro, EMT  August 2, 2021  "

## 2021-08-02 NOTE — PATIENT INSTRUCTIONS
Genetics  [facility]     Contact our nurse care coordinator Jaquelin CASTANON, RN, PHN at [phone] or send a [employer] message for any non-urgent general or medical questions.     If you had genetic testing and have further questions, please contact the genetic counselor:    Jeanette Calero  Ph: [phone]    To schedule appointments:  Pediatric Call Center for [facility]: [phone]  Neuropsychology [phone]  Radiology/ Imaging/Echocardiogram: [phone]   Services:   [phone]     You should receive a phone call about your next appointment. If you do not receive this within two weeks of your visit, please call [phone].     If you have not already done so consider signing up for [employer] by speaking with the person at the  on your way out or go to [email or web address] to sign up online.     [employer] enables easy and confidential communication with your care team.

## 2021-08-11 ENCOUNTER — MYC MEDICAL ADVICE (OUTPATIENT)
Dept: FAMILY MEDICINE | Facility: CLINIC | Age: 31
End: 2021-08-11

## 2021-08-25 ENCOUNTER — TELEPHONE (OUTPATIENT)
Dept: CONSULT | Facility: CLINIC | Age: 31
End: 2021-08-25

## 2021-08-25 NOTE — TELEPHONE ENCOUNTER
Briana from [facility] calling for the following orders:  1. P.T. One time a week for 4 weeks to progress strength, balance and functional activity tolerance.  2. Home Health Aid to continue 2 times a week for 4 weeks to assist with bathing.  Orders approved MISTI Grady RN     [facility] Call Center    Phone Message    May a detailed message be left on voicemail: no     Reason for Call: Authorization denial    [facility] authorization testing denial, urgent request sent for call back.  Per patient request waiting on provider to see if there will be any appeal. Please call Elizaebth back at [phone] voicemail is not currently working.

## 2021-08-25 NOTE — TELEPHONE ENCOUNTER
I called Elizabeth back and received her voicemail. I provided her with my direct phone number and asked me to call her back regarding the denial.    If Elizabeth, calls back to the call center, please forward her call to me directly.     Jeanette Calero MS, [facility]  Licensed Genetic Counselor  [facility]  Phone: [phone]  Fax: [phone]

## 2021-08-26 NOTE — PROGRESS NOTES
"GENETIC COUNSELING CONSULTATION NOTE    Date of visit: 08/02/21    Presenting Information:   Rekha \"Fabby\" Fernanda is a 30 year old female referred to the [facility] due to family history of OI and possible connective tissue disorder. She was seen for a genetic counseling appointment in coordination with Dr. Petersen today.     Personal History:   I previously saw Fabby for a virtual genetic counseling visit. The following history was taken at that visit on 5/7/21:  Fabby has a family history of OI type 2 in her brother who passed away in 1984 shortly after he was born. Fabby requested a genetic counseling visit to discuss this family history, especially in the context of some of her health concerns and other family member's diagnoses.      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 As a child, Fabby had a wrist fracture and other hairline fractures in her ankles. She had several cavities as a child despite good dental hygiene. As an adult, she has had multiple ankle sprains and sprained her shoulder when she was putting on a t-shirt. She reports some episodes of neck spasms and went to the ED once when she was unable to swallow during one of these spasms. Fabby has endometriosis and had a hysterectomy.      Fabby was seen at an ENT at [facility] after this and was found to have some swollen lymph nodes in her neck. She has seen a number of other specialists including Neurology at [facility], Orthopedics at [facility] orthopedics for her pain who recommended PT, Rheumatology at [facility] for work-up for possible rheumatologic work-up for her pain but labs were burke, and Dermatology at [facility] and [facility]. She reports that she sees dermatology for lesions on " "her scalp and other histiocyte lesions/granuloma. Fabby reports that she has some hypopigmented patches on her body and some are over her more painful joints. She also reports a lacy pattern on her legs that is possibly due to blood pooling.      Fabby reports no other concerns for herself today. She has not had an echocardiogram.    Please refer to Dr. Petersen's note for further details of Fabby's medical history and evaluation from today.    Family History: A three generation pedigree was obtained and scanned into the electronic medical record. The relevant portions are described below:      Siblings-   ? Fabby had a brother who passed away at 1 day old (in 1984) due to OI type 2.   ? Fabby has a 38 year old brother who is reported to have blue sclerae, poor wound healing, hypermobility, dental problems, and GI issues such as hernias. He also has HIV.   ? Fabby has a 35 year old sister who is reported to have been given a diagnosis of EDS, no subtype specified yet. She has a history of a seizure disorder that presented in her teens, multiple dislocations, sprains, and fractures of her toes, thin scars, and dental problems such as many cavities despite good dental hygiene and her enamel came off when she had her braces removed as a teen.    She has a 5 year old son who is reported to have blue sclerae, very flexible joints, and sensitive skin.     She has a 6 month old son who is reported to have blue sclerae, sensitive skin-several episodes of hives, and his toes pop out of socket easily.    Parents- Fabby's mother is 64 years old and a history of thin scars, dislocations/sprains in childhood, had heart surgery at age 10 due to a \"sack around her heart,\" three torn tendons in her shoulder and bilateral knee replacement as an adult. Fabby's father is 64 years old and has a history of obesity and a stroke in his 60's.    Maternal Relatives- Fabby has six maternal aunts and two maternal uncles:  ? One aunt has a history of uterine " "cancer diagnosed in her 60's which was found upon having a hysterectomy.     She has one son, age 30, who was given a clincial diagnosis of OI due to his history of multiple broken bones. He has not had genetic testing.     She has one daughter who ha antiphospholipid syndrome.    She has one son who is healthy.  ? One aunt has uterine cancer diagnosed in her 50's. She has multiple children who are healthy.  ? One aunt had a history of antiphospholipid syndrome and passed away in a car accident. She had no children.   ? Fabby's other aunts and uncles are alive and well. One uncle has one son who has alopecia totalis. One aunt has a daughter who has hypermobility and possibly has EDS. Fabby's other maternal cousins are alive and well.  ? Fabby's maternal grandmother is 89 years old and has a history of osteoarthritis, squamous cell carcinoma, kidney cancer in her 60-70's, a cavernous hemangioma, and a TIA. Her mother  at age 96 and is reported to have had \"small strokes.\"   ? Fabby's maternal grandfather had a history of glaucoma, multiple skin cancers, and osteoporosis. He  at age 87 due to dementia.    Paternal Relatives- Fabby has two paternal uncles who are alive and well. She has one paternal aunt who  at age 47 due to metastatic uterine cancer. Fabby's paternal cousins are reported to be alive and well, but there is limited information about their health. Fabby's paternal grandmother had a history of thrombocytosis, a stroke at age 60 and  at age 61 due to CML. Fabby's paternal grandfather  at age 66 due to congestive heart failure and mesothelioma.      Family history is otherwise largely non-contributory. Maternal ancestry is  and paternal ancestry is Ethiopian and other Europan. Consanguinity was denied.    Genetic Counseling Discussion:  We reviewed that our bodies are made of cells that contain our chromosomes which are made up of long stretches of DNA containing our genes. Our genes serve as the " "instructions for our bodies to grow and function. We have two copies of each gene, one inherited from our mother and one inherited from our father.     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 This genetic counseling appointment was scheduled to discussed the family history of OI. However, given her family history and personal history, I think an evaluation with a  is warranted for Fabby and possibly some genetic testing for OI.     The majority of cases of osteogenesis imperfecta (OI) are caused by variants in COL1A1 and COL1A2. OI caused by variants in these genes are categorized into four different subtypes based on age of onset and severity:    Type 1/ Classic non-deforming OI: Mild form of OI that causes few to many fractures, blue sclerae, and hearing loss (reorted in 50% of cases). About 60% of cases are de merlin    Type 2/  lethal OI: Most severe form of OI. About 60% of individuals pass away at day 1 of life, 80% pass away by 1 week of life, and survival beyond 1 year is rare. OI type 2 causes severe bone deformity, minimal calvarial mineralization, platyspondyly, compression of long bones, severely short stature, and dark blue sclerae. Virtually all of these cases are de merlin or due to germline mosaicism.    Type 3/ Progressively deforming OI: A severe form of OI that causes moderate to severe bone deformity such as thin ribs, platyspondyly, thin gracile bones with many fractures, \"popcorn\" epiphyses, very short stature, frequent hearing loss (onset usually in teens), dentinogenesis imperfecta, and blue sclerae. Most individuals with OI type 3 do not walk without assistance.     Type 4/ Common variant OI: a mild to moderate form of OI that causes multiple fractures, variably short stature, and sometimes hearing loss (adult onset). Sclerae is usually normal/white.      These " forms of OI are inherited with autosomal dominant inheritance which means a single variant in the COL1A1 or COL1A2 genes is sufficient to cause the condition. As previously mentioned, many cases are de merlin, meaning a child has the COL1A1 or COL1A2 as the result of a new variant that is not inherited from a parent. However, parental somatic and/or germline mosaicism is present in up to 16% of families.      There are several genotype-phenotype correlations with COL1A1 and COL1A2 variants and how they impact type 1 collagen function and therefore the severity of OI. Fabby's brother passed away from type 2 OI and he did not have any genetic testing. However, it is unlikely that Fabby would have the same COL1A1 and COL1A2 variant, as the variants that cause type 2 OI are severe, but we cannot rule out the possibility of mosaicism. It is interesting that Fabby and her other living siblings have some OI features and she has a 30 year old cousin with a clinical diagnosis of OI. It is possible that Fabby's brother could have had a different  lethal condition with a similar presentation such as  hypophosphatasia, thanatophoric dysplasia, achondrogenesis type 1B, or campomelic dysplasia. This cannot be clarified any further, and even if her brother had type 2 OI it is possible that Fabby and other family members may have a different type of OI due to a different gene variant or a different connective tissue disorder explaining their features.      Nikhil-Danlos syndrome (EDS) is a connective tissue disorder with 13 different subtypes. There are three main subtypes, Classical EDS (cEDS), Vascular EDS (vEDS), and Hypermobile EDS (hEDS). The main features of cEDS include generalized joint hypermobility, dislocations/subluxations, hernia, easy bruising, and skin that is hyperextensible, soft and fragile with atrophic scarring. cEDS is an autosomal dominant condition typically caused by mutations in the COL5A1 and  COL5A2 genes and rarely COL1A1. vEDS is a rarer and more severe type of EDS. Individuals with vEDS are at risk for arterial rupture, colon perforation, and other organ rupture such as the uterus during pregnancy. Other features of vEDS include thin, translucent skin, small joint hypermobility, tendon or muscle rupture, acrogeria, spontaneous pneumothorax, easy bruising, and others. vEDS is an autosomal dominant condition caused by mutations in the COL3A1 gene. hEDS is the most common type of EDS and is characterized by generalized joint hypermobility, GI issues, dislocations, skin findings similar to those found in cEDS but with a much milder presentation, chronic pain, and others. hEDS is thought to be an autosomal dominant condition predominantly affecting females, but the genetic cause of hEDS remains unknown and therefore diagnosis of hEDS relies on meeting clinical diagnostic criteria. Updated diagnostic criteria was published by the International EDS Consortium in 2017 and incorporates the Beighton score for generalized joint hypermobility assessment, family history, chronic pain, presence of other minor features of connective tissue disorders, and ruling out other connective tissue disorders (DONATO Rincon. et al. 2017.The 2017 international classification of the Nikhil-Danlos syndromes. Am J Med Mary Part C Semin Med Mary 175C: 8- 26).     Less than 1% of classic EDS is attributed to a specific missense COL1A1 variant. Other Specific missense and splice site variants in COL1A1 and COL1A2 have been associated with other subtypes of EDS: cardiac valvular EDS which is an autosomal recessive subtype that causes skin hyperextensibility, atrophic scarring, easy bruising, joint hypermobility, and severe progressive cardiac valvular problems, and arthrochalasia EDS which causes congenital hip dislocations, extreme joint laxity with recurrent joint subluxations and minimal skin involvement.     Prince Sequeira's family  history and personal history, we are recommending genetic testing for OI and other connective tissue disorders. We reviewed the availability of genetic testing via Next Generation Sequencing (NGS) for the [facility] OI and Connective Tissue Disorders panels.  We went on to discuss the details, limitations, and possible outcomes of NGS. In particular, we discussed that there are three possible results from NGS:    Negative: meaning normal or no mutations are identified in the genes that were tested/sequenced    Positive: meaning a mutation that is known to be associated with a particular set of symptoms is identified    Variant of uncertain significance (VUS): meaning a change in the DNA sequence of a particular gene was seen but there is not enough information or data yet to know if it explains the symptoms. If a VUS is identified, testing of other relatives may be helpful to provide clarification.  In most cases, identification of a VUS does not confirm a diagnosis and does not result in any clinically actionable recommendations.    We discussed the potential benefits of genetic testing and why this genetic testing is medically indicated. A positive result will help determine the etiology of the connective tissue disorder features noted in Fabby and may guide the medical management for her. Also, if a genetic cause is found for Fabby's features, it will give us a more accurate risk assessment for other family members, and Fabby's future children.    Next Generation Sequencing is a well established technology utilized by all molecular genetic labs throughout the country for identifying disease-causing mutations in various genes.  Next Generation Sequencing is currently the standard of care for genetic testing of single genes.  The recommended testing for Rekha  is DIAGNOSTIC testing, and it is NOT investigational.    Fabby consented to genetic testing today. [facility] will conduct a benefits investigation to determine  potential cost of testing. If the estimated cost is >$100, Fabby will receive a phone call or text message with the estimated cost and payment options and can decide if they would like to proceed. If the estimated cost is <$100, [facility] will initiate the testing. I will call the Fabby with the results about 3-4 weeks after the testing begins.    It was a pleasure seeing Fabby again today. She was encouraged to reach out to me if she has any further questions.     Plan:  1. [facility] OI and Connective Tissue Disorders Panel.       Jeanette Calero MS, [facility]  Licensed Genetic Counselor   [facility]  Phone: [phone]  Fax: [phone]    Time spent in consultation face to face was approximately 15 minutes.

## 2021-08-30 LAB
Lab: NORMAL
PERFORMING LABORATORY: NORMAL
SCANNED LAB RESULT: NORMAL
SPECIMEN STATUS: NORMAL
TEST NAME: NORMAL

## 2021-09-03 ENCOUNTER — TELEPHONE (OUTPATIENT)
Dept: CONSULT | Facility: CLINIC | Age: 31
End: 2021-09-03

## 2021-09-03 NOTE — LETTER
September 20, 2021      TO: Rekha Michael  [address] [place] [address]           Dear Rekha,    Please find included with this letter a copy of your genetic test report. Below is a summary of your results.     You had genetic testing for 130 genes known to cause osteogenesis imperfect and other connective tissue disorders. The results of this test are essentially negative at this time, meaning this test did not find an underlying genetic cause for your connective tissue disorder features or the family history of connective tissue disorder/osteogenesis imperfecta.     Your genetic testing identified two variants of uncertain significance (VUS) in two different genes. A VUS means a change in the gene was found but there is not enough data or information yet to know if that change is harmful to the gene and causes a particular condition (pathogenic) or if is harmless (benign) change.     The first VUS was identified in the LTBP3 gene and is called c.457G>A (p.[id number]). This gene is associated with two different genetic conditions:    Autosomal recessive LTBP3-related disorder which causes dental anomalies of almost absent enamel, short stature, and heart defects (mitral valve prolapse, aortic root dilation, aortic or other arterial aneurysm) in some patients. Autosomal recessive means an individual needs two likely pathogenic/pathogenic variants, one on each copy of the gene, in order to be affected with the condition. I have not come across reports of carriers for this recessive condition having dental anomalies in the medical literature thus far.     Autosomal dominant geleophysic dysplasia which is a condition that causes short stature, brachydactyly, restricted movements in the elbow joints, dysmorphic facial features, stiff joints and thick skin.   You does not fit with the clinical picture of geleophysic dysplasia and due to the fact that only one variant was identified in this gene and the  fact that it is currently classified as a VUS, means this variant is not contributing to your symptoms and you would not be considered a carrier for these conditions at this time.    The second VUS was identified in the MED12 gene and is called c.5922G>T ([id number]). This gene is associated with various X-linked conditions:    Hardikar syndrome which causes facial clefting (cleft lip and/or cleft palate), pigmentary retinopathy, biliary anomalies, liver disease, and intestinal malrotation. To date, this condition only affects females.    Matamoros-Fryns syndrome which causes intellectual disability, Marfanoid body habitus, hypotonia, large head, and cardiac features such as ventricular septal defect or aortic root dilation. Carrier females are typically unaffected.    Ohdo syndrome which causes blepharophimosis, ptosis, dysmorphic facial features, developmental delays, hearing loss, hypermobility, and feeding problems. Carrier females are typically unaffected, but some females with clinical features have been reported.    Opitz-Kaveggai syndrome (also referred to as FG syndrome type 1) which causes developmental delays, dysmorphic facial features, macrocephaly, hypotonia, constipation, and sometimes congenital anomalies such as agenesis of the corpus callosum or anal, cardiac, or skeletal anomalies. Carrier females are typically unaffected.  Again, you does not fit with the clinical picture of these [id number] disorders and because this variant is currently classified as a VUS, you would not be considered a carrier for these conditions at this time.     Overall, these variants do not explain your connective tissue disorder features. These negative results cannot completely rule out an underlying genetic cause for your features as this test was not analyzing every gene and there are very likely several genes that are still yet to be discovered that cause connective tissue disorders. We would not recommend family  members be tested for these VUS. We are not recommending any further genetic testing for you at this time or other genetics follow-up. If you have any new medical concerns that arise, you can reach out to us at any point in the future. If the lab updates the classification of these VUS, they will send us an amended report and we will update you.     Please let me know if you have any further questions about these test results.     Jeanette Calero MS, [facility]  Licensed Genetic Counselor  [facility]  Phone: [phone]  Fax: [phone]

## 2021-09-03 NOTE — TELEPHONE ENCOUNTER
I called Fabby and left her a voicemail. I told her I was calling to make sure she received my secure email with her results and asked her to call me back so we can her review her test results.     Jeanette Calero MS, [facility]  Licensed Genetic Counselor  [facility]  Phone: [phone]  Fax: [phone]

## 2021-09-08 NOTE — TELEPHONE ENCOUNTER
I called Fabby and left her a second voicemail. I asked her to call me back to review her genetic test results.     Jeanette Calero MS, [facility]  Licensed Genetic Counselor  [facility]  Phone: [phone]  Fax: [phone]

## 2021-09-20 NOTE — TELEPHONE ENCOUNTER
I made a third attempt to reach Fabby to review her genetic test results. She was unavailable so I left her a voicemail saying I would be sending her a letter in the mail which will summarize her test results. I will send this with a copy of her report. She was encouraged to reach out to me if she has any other questions after reviewing her results summary.     Results summary will be as follows (in Letters tab 9/20/21)        Fabby had genetic testing for 130 genes known to cause osteogenesis imperfect and connective tissue disorders. The results of this test are essentially negative at this time, as this test did not find an underlying genetic cause for Fabby's connective tissue disorder features or the family history of connective tissue disorder/ osteogenesis imperfecta.     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 A VUS means a change in the gene was found but there is not enough data or information yet to know if that change is harmful to the gene and causes a particular condition (pathogenic) or if is harmless (benign) change.     The first VUS was identified in the LTBP3 gene and is called c.457G>A ([id number]). This gene is associated with two different genetic conditions:    Autosomal recessive LTBP3-related disorder which causes dental anomalies of almost absent enamel, short stature, and heart defects (mitral valve prolapse, aortic root dilation, aortic or other arterial aneurysm) in some patients. Autosomal recessive means an individual needs two likely pathogenic/pathogenic variants, one on each copy of the gene, in order to be affected with the condition. I have not come across reports of carriers for this recessive condition having dental anomalies in the medical literature thus far.     Autosomal dominant geleophysic dysplasia which is a condition that causes short stature, brachydactyly, restricted movements in the elbow joints, dysmorphic  facial features, stiff joints and thick skin.   Fabby does not fit with the clinical picture of geleophysic dysplasia and due to the fact that only one variant was identified in this gene and the fact that it is currently classified as a VUS, means this variant is not contributing to Fabby's symptoms and she would not be considered a carrier for these conditions at this time.    The second VUS was identified in the MED12 gene and is called c.5922G>T ([id number]). This gene is associated with various X-linked conditions:    Hardikar syndrome which causes facial clefting (cleft lip and/or cleft palate), pigmentary retinopathy, biliary anomalies, liver disease, and intestinal malrotation. To date, this condition only affects females.    Matamoros-Fryns syndrome which causes intellectual disability, Marfanoid body habitus, hypotonia, large head, and cardiac features such as ventricular septal defect or aortic root dilation. Carrier females are typically unaffected.    Ohdo syndrome which causes blepharophimosis, ptosis, dysmorphic facial features, developmental delays, hearing loss, hypermobility, and feeding problems. Carrier females are typically unaffected, but some females with clinical features have been reported.    Opitz-Kaveggai syndrome (also referred to as FG syndrome type 1) which causes developmental delays, dysmorphic facial features, macrocephaly, hypotonia, constipation, and sometimes congenital anomalies such as agenesis of the corpus callosum or anal, cardiac, or skeletal anomalies. Carrier females are typically unaffected.  Again, Fabby does not fit with the clinical picture of these LKA84-djugszn disorders and because this variant is currently classified as a VUS, she would not be considered a carrier for these conditions at this time.     Overall, these variants do not explain Fabby's connective tissue disorder features. These negative results cannot completely rule out an underlying genetic cause for her  features as this test was not analyzing every gene and there are very likely several genes that are still yet to be discovered that cause connective tissue disorders. We would not recommend family members be tested for these VUS. We are not recommending any further genetic testing for Fabby at this time or other genetics follow-up. If she has any new medical concerns that arise she can reach out to us at any point in the future. If the lab updates the classification of these VUS, they will send us an amended report and we will update Fabby.     Fabby can call me if she has any further questions.     Jeanette Calero MS, [facility]  Licensed Genetic Counselor  [facility]  Phone: [phone]  Fax: [phone]

## 2021-09-25 ENCOUNTER — HEALTH MAINTENANCE LETTER (OUTPATIENT)
Age: 31
End: 2021-09-25

## 2021-12-03 ENCOUNTER — VIRTUAL VISIT (OUTPATIENT)
Dept: NEUROLOGY | Facility: CLINIC | Age: 31
End: 2021-12-03
Payer: COMMERCIAL

## 2021-12-03 DIAGNOSIS — R69 DIAGNOSIS DEFERRED: Primary | ICD-10-CM

## 2021-12-03 PROCEDURE — 99207 PR NO BILLABLE SERVICE THIS VISIT: CPT | Performed by: PSYCHIATRY & NEUROLOGY

## 2021-12-03 NOTE — PROGRESS NOTES
[facility] Neurology Follow Up Visit    Rekha Michael MRN# [id number]   Age: 31 year old YOB: 1990     Brief history of symptoms: The patient was initially seen in neurologic consultation on 4/26/2021 and most recently 6/4/2021 for evaluation of cognitive impairment/concerns. Please see the comprehensive neurologic consultation notes from those dates in the Epic records for details.     Overall impression was for poor attention and poor short term recall likely from a multi-factorial standpoint.  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   She was recommended to follow with Endocrinology for TPO elevation, obtain outside imaging for my review, and have neuropsychiatric testing done in the future. Regarding hand symptoms, it wasn't thought that her sensory reports were consistent with a CTS/Ulnar neuropathy symptom, but an EMG was to be considered if symptoms persisted.  Given her attention deficits, and report of poor focus, it was thought she may benefit from neuropsychological testing.  B6 levels were to be considered if sensory issues became more concerning for a small fiber type pain syndrome.    The patient was seen through cardiology at [facility] for possible POTS, and was to have a one week extended holter with TTE.  Following results, consideration towards Tiltt table and EP consult was to be made.    The patient was also continuing to w/up possible etiology for her joint hypermobility, and was to have genetic testing for OI (hairline fractures, dislocations, blue sclerae, dental changes) through her PCP and other providers.  Testing was reported as negative on 9/20/2021.    ------------------------  The patient was unable to be contacted today.  She can follow up as needed, hopefully following previously recommended testing with neuropsychology.    TONY Horne D.O.  [facility]  Neurology  -------------------------

## 2021-12-03 NOTE — LETTER
12/3/2021       RE: Rekha Michael  [address]  [place] [address]     Dear Colleague,    Thank you for referring your patient, Rekha Michael, to the [facility] [place] at [facility]. Please see a copy of my visit note below.    [facility] Neurology Follow Up Visit    Rekha Michael MRN# [id number]   Age: 31 year old YOB: 1990     Brief history of symptoms: The patient was initially seen in neurologic consultation on 4/26/2021 and most recently 6/4/2021 for evaluation of cognitive impairment/concerns. Please see the comprehensive neurologic consultation notes from those dates in the Epic records for details.     Overall impression was for poor attention and poor short term recall likely from a multi-factorial standpoint.  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   She was recommended to follow with Endocrinology for TPO elevation, obtain outside imaging for my review, and have neuropsychiatric testing done in the future. Regarding hand symptoms, it wasn't thought that her sensory reports were consistent with a CTS/Ulnar neuropathy symptom, but an EMG was to be considered if symptoms persisted.  Given her attention deficits, and report of poor focus, it was thought she may benefit from neuropsychological testing.  B6 levels were to be considered if sensory issues became more concerning for a small fiber type pain syndrome.    The patient was seen through cardiology at [facility] for possible POTS, and was to have a one week extended holter with TTE.  Following results, consideration towards Tiltt table and EP consult was to be made.    The patient was also continuing to w/up possible etiology for her joint hypermobility, and was to have genetic testing for OI (hairline fractures, dislocations, blue sclerae, dental  changes) through her PCP and other providers.  Testing was reported as negative on 9/20/2021.    ------------------------  The patient was unable to be contacted today.  She can follow up as needed, hopefully following previously recommended testing with neuropsychology.    TONY Horne D.O.  [facility]  -------------------------

## 2022-05-07 ENCOUNTER — HEALTH MAINTENANCE LETTER (OUTPATIENT)
Age: 32
End: 2022-05-07

## 2023-01-07 ENCOUNTER — HEALTH MAINTENANCE LETTER (OUTPATIENT)
Age: 33
End: 2023-01-07

## 2023-06-02 ENCOUNTER — HEALTH MAINTENANCE LETTER (OUTPATIENT)
Age: 33
End: 2023-06-02

## 2024-06-23 ENCOUNTER — HEALTH MAINTENANCE LETTER (OUTPATIENT)
Age: 34
End: 2024-06-23